# Patient Record
Sex: FEMALE | Race: WHITE | NOT HISPANIC OR LATINO | Employment: OTHER | URBAN - METROPOLITAN AREA
[De-identification: names, ages, dates, MRNs, and addresses within clinical notes are randomized per-mention and may not be internally consistent; named-entity substitution may affect disease eponyms.]

---

## 2021-06-25 ENCOUNTER — OFFICE VISIT (OUTPATIENT)
Dept: PRIMARY CARE CLINIC | Facility: CLINIC | Age: 61
End: 2021-06-25
Payer: MEDICAID

## 2021-06-25 VITALS
WEIGHT: 220.25 LBS | HEART RATE: 65 BPM | SYSTOLIC BLOOD PRESSURE: 110 MMHG | HEIGHT: 60 IN | BODY MASS INDEX: 43.24 KG/M2 | OXYGEN SATURATION: 95 % | DIASTOLIC BLOOD PRESSURE: 71 MMHG

## 2021-06-25 DIAGNOSIS — L30.4 INTERTRIGO: ICD-10-CM

## 2021-06-25 DIAGNOSIS — E11.65 TYPE 2 DIABETES MELLITUS WITH HYPERGLYCEMIA, UNSPECIFIED WHETHER LONG TERM INSULIN USE: ICD-10-CM

## 2021-06-25 DIAGNOSIS — Z12.39 ENCOUNTER FOR SCREENING FOR MALIGNANT NEOPLASM OF BREAST, UNSPECIFIED SCREENING MODALITY: Primary | ICD-10-CM

## 2021-06-25 PROCEDURE — 99203 OFFICE O/P NEW LOW 30 MIN: CPT | Mod: S$GLB,,, | Performed by: INTERNAL MEDICINE

## 2021-06-25 PROCEDURE — 99203 PR OFFICE/OUTPT VISIT, NEW, LEVL III, 30-44 MIN: ICD-10-PCS | Mod: S$GLB,,, | Performed by: INTERNAL MEDICINE

## 2021-06-25 RX ORDER — IBUPROFEN 100 MG/5ML
1000 SUSPENSION, ORAL (FINAL DOSE FORM) ORAL DAILY
COMMUNITY

## 2021-06-25 RX ORDER — CHOLECALCIFEROL (VITAMIN D3) 25 MCG
1000 TABLET ORAL DAILY
COMMUNITY

## 2021-06-25 RX ORDER — MONTELUKAST SODIUM 10 MG/1
10 TABLET ORAL NIGHTLY
COMMUNITY
Start: 2021-06-04 | End: 2022-03-15 | Stop reason: SDUPTHER

## 2021-06-25 RX ORDER — DULOXETIN HYDROCHLORIDE 60 MG/1
120 CAPSULE, DELAYED RELEASE ORAL EVERY MORNING
COMMUNITY
Start: 2021-02-22

## 2021-06-25 RX ORDER — LISINOPRIL 5 MG/1
5 TABLET ORAL NIGHTLY
COMMUNITY
Start: 2021-06-01 | End: 2022-04-21 | Stop reason: ALTCHOICE

## 2021-06-25 RX ORDER — CALCIUM CARB/VITAMIN D3/VIT K1 650MG-12.5
TABLET,CHEWABLE ORAL NIGHTLY
COMMUNITY

## 2021-06-25 RX ORDER — TEMAZEPAM 15 MG/1
15 CAPSULE ORAL NIGHTLY
COMMUNITY
Start: 2021-02-09 | End: 2022-04-21

## 2021-06-25 RX ORDER — ATORVASTATIN CALCIUM 40 MG/1
40 TABLET, FILM COATED ORAL NIGHTLY
COMMUNITY
Start: 2021-03-18 | End: 2021-09-24 | Stop reason: SDUPTHER

## 2021-06-25 RX ORDER — CALCIUM CARB/VITAMIN D3/VIT K1 500-500-40
1 TABLET,CHEWABLE ORAL DAILY
COMMUNITY

## 2021-06-25 RX ORDER — CLONAZEPAM 0.5 MG/1
0.5 TABLET ORAL DAILY
COMMUNITY
Start: 2021-05-16 | End: 2022-02-23

## 2021-06-25 RX ORDER — NYSTATIN 100000 U/G
CREAM TOPICAL 2 TIMES DAILY
Qty: 80 G | Refills: 3 | Status: SHIPPED | OUTPATIENT
Start: 2021-06-25 | End: 2022-02-23

## 2021-06-25 RX ORDER — HYDROCODONE BITARTRATE AND ACETAMINOPHEN 7.5; 325 MG/1; MG/1
1 TABLET ORAL 2 TIMES DAILY PRN
COMMUNITY
Start: 2021-05-08

## 2021-06-25 RX ORDER — BACLOFEN 10 MG/1
10 TABLET ORAL 3 TIMES DAILY
COMMUNITY
Start: 2021-06-04 | End: 2021-08-30 | Stop reason: SDUPTHER

## 2021-06-25 RX ORDER — LORATADINE 10 MG/1
10 TABLET ORAL DAILY
COMMUNITY
End: 2022-11-30 | Stop reason: ALTCHOICE

## 2021-06-25 RX ORDER — GABAPENTIN 800 MG/1
800 TABLET ORAL 2 TIMES DAILY
COMMUNITY
Start: 2021-05-07 | End: 2021-08-30 | Stop reason: SDUPTHER

## 2021-08-16 ENCOUNTER — TELEPHONE (OUTPATIENT)
Dept: PRIMARY CARE CLINIC | Facility: CLINIC | Age: 61
End: 2021-08-16

## 2021-08-17 ENCOUNTER — TELEPHONE (OUTPATIENT)
Dept: PRIMARY CARE CLINIC | Facility: CLINIC | Age: 61
End: 2021-08-17

## 2021-08-30 ENCOUNTER — OFFICE VISIT (OUTPATIENT)
Dept: PRIMARY CARE CLINIC | Facility: CLINIC | Age: 61
End: 2021-08-30
Payer: MEDICAID

## 2021-08-30 VITALS
OXYGEN SATURATION: 95 % | HEART RATE: 80 BPM | HEIGHT: 60 IN | WEIGHT: 206 LBS | TEMPERATURE: 98 F | RESPIRATION RATE: 16 BRPM | DIASTOLIC BLOOD PRESSURE: 70 MMHG | SYSTOLIC BLOOD PRESSURE: 112 MMHG | BODY MASS INDEX: 40.44 KG/M2

## 2021-08-30 DIAGNOSIS — M43.22 CERVICAL VERTEBRAL FUSION: ICD-10-CM

## 2021-08-30 DIAGNOSIS — G62.9 NEUROPATHY: Primary | ICD-10-CM

## 2021-08-30 DIAGNOSIS — E11.65 TYPE 2 DIABETES MELLITUS WITH HYPERGLYCEMIA, UNSPECIFIED WHETHER LONG TERM INSULIN USE: ICD-10-CM

## 2021-08-30 PROCEDURE — 99213 OFFICE O/P EST LOW 20 MIN: CPT | Mod: S$GLB,,, | Performed by: INTERNAL MEDICINE

## 2021-08-30 PROCEDURE — 99213 PR OFFICE/OUTPT VISIT, EST, LEVL III, 20-29 MIN: ICD-10-PCS | Mod: S$GLB,,, | Performed by: INTERNAL MEDICINE

## 2021-08-30 RX ORDER — TRAMADOL HYDROCHLORIDE 50 MG/1
50 TABLET ORAL
COMMUNITY
Start: 2021-05-20 | End: 2021-08-30 | Stop reason: SDUPTHER

## 2021-08-30 RX ORDER — LIDOCAINE 50 MG/G
PATCH TOPICAL
COMMUNITY
Start: 2021-08-22 | End: 2022-11-30 | Stop reason: ALTCHOICE

## 2021-08-30 RX ORDER — GABAPENTIN 800 MG/1
800 TABLET ORAL 2 TIMES DAILY
Qty: 60 TABLET | Refills: 2 | Status: SHIPPED | OUTPATIENT
Start: 2021-08-30 | End: 2021-11-30

## 2021-08-30 RX ORDER — OXYCODONE AND ACETAMINOPHEN 10; 325 MG/1; MG/1
1 TABLET ORAL EVERY 4 HOURS PRN
COMMUNITY
Start: 2021-07-30 | End: 2021-08-30 | Stop reason: SDUPTHER

## 2021-08-30 RX ORDER — ONDANSETRON 4 MG/1
4 TABLET, FILM COATED ORAL 3 TIMES DAILY PRN
COMMUNITY
Start: 2021-07-30 | End: 2022-02-15 | Stop reason: SDUPTHER

## 2021-08-30 RX ORDER — INSULIN GLARGINE 100 [IU]/ML
INJECTION, SOLUTION SUBCUTANEOUS
COMMUNITY
Start: 2021-06-19 | End: 2022-03-26 | Stop reason: ALTCHOICE

## 2021-08-30 RX ORDER — CLONIDINE HYDROCHLORIDE 0.1 MG/1
0.1 TABLET ORAL DAILY
COMMUNITY
Start: 2021-05-20 | End: 2022-02-23

## 2021-08-30 RX ORDER — METHOCARBAMOL 500 MG/1
500 TABLET, FILM COATED ORAL 2 TIMES DAILY PRN
COMMUNITY
Start: 2021-08-23 | End: 2022-02-23

## 2021-09-01 PROBLEM — M19.011 PRIMARY OSTEOARTHRITIS OF RIGHT SHOULDER: Status: ACTIVE | Noted: 2021-09-01

## 2021-09-24 ENCOUNTER — OFFICE VISIT (OUTPATIENT)
Dept: PRIMARY CARE CLINIC | Facility: CLINIC | Age: 61
End: 2021-09-24
Payer: MEDICAID

## 2021-09-24 ENCOUNTER — TELEPHONE (OUTPATIENT)
Dept: PRIMARY CARE CLINIC | Facility: CLINIC | Age: 61
End: 2021-09-24

## 2021-09-24 VITALS
OXYGEN SATURATION: 98 % | SYSTOLIC BLOOD PRESSURE: 134 MMHG | WEIGHT: 200 LBS | RESPIRATION RATE: 18 BRPM | HEART RATE: 89 BPM | BODY MASS INDEX: 39.27 KG/M2 | HEIGHT: 60 IN | DIASTOLIC BLOOD PRESSURE: 88 MMHG

## 2021-09-24 DIAGNOSIS — E11.65 TYPE 2 DIABETES MELLITUS WITH HYPERGLYCEMIA, UNSPECIFIED WHETHER LONG TERM INSULIN USE: Primary | ICD-10-CM

## 2021-09-24 DIAGNOSIS — Z23 IMMUNIZATION DUE: ICD-10-CM

## 2021-09-24 DIAGNOSIS — G62.9 NEUROPATHY: ICD-10-CM

## 2021-09-24 DIAGNOSIS — M43.22 CERVICAL VERTEBRAL FUSION: ICD-10-CM

## 2021-09-24 PROCEDURE — 99214 PR OFFICE/OUTPT VISIT, EST, LEVL IV, 30-39 MIN: ICD-10-PCS | Mod: 25,S$GLB,, | Performed by: INTERNAL MEDICINE

## 2021-09-24 PROCEDURE — 90732 PPSV23 VACC 2 YRS+ SUBQ/IM: CPT | Mod: S$GLB,,, | Performed by: INTERNAL MEDICINE

## 2021-09-24 PROCEDURE — 90732 PNEUMOCOCCAL POLYSACCHARIDE VACCINE 23-VALENT =>2YO SQ IM: ICD-10-PCS | Mod: S$GLB,,, | Performed by: INTERNAL MEDICINE

## 2021-09-24 PROCEDURE — 90471 IMMUNIZATION ADMIN: CPT | Mod: S$GLB,,, | Performed by: INTERNAL MEDICINE

## 2021-09-24 PROCEDURE — 99214 OFFICE O/P EST MOD 30 MIN: CPT | Mod: 25,S$GLB,, | Performed by: INTERNAL MEDICINE

## 2021-09-24 PROCEDURE — 90471 PNEUMOCOCCAL POLYSACCHARIDE VACCINE 23-VALENT =>2YO SQ IM: ICD-10-PCS | Mod: S$GLB,,, | Performed by: INTERNAL MEDICINE

## 2021-09-24 RX ORDER — ATORVASTATIN CALCIUM 40 MG/1
40 TABLET, FILM COATED ORAL NIGHTLY
Qty: 90 TABLET | Refills: 3 | Status: SHIPPED | OUTPATIENT
Start: 2021-09-24 | End: 2022-05-05

## 2021-09-25 LAB
BUN SERPL-MCNC: 17 MG/DL (ref 7–25)
BUN/CREAT SERPL: 14 (CALC) (ref 6–22)
CALCIUM SERPL-MCNC: 10.3 MG/DL (ref 8.6–10.4)
CHLORIDE SERPL-SCNC: 98 MMOL/L (ref 98–110)
CHOLEST SERPL-MCNC: 171 MG/DL
CHOLEST/HDLC SERPL: 3.4 (CALC)
CO2 SERPL-SCNC: 27 MMOL/L (ref 20–32)
CREAT SERPL-MCNC: 1.21 MG/DL (ref 0.5–0.99)
GLUCOSE SERPL-MCNC: 170 MG/DL (ref 65–99)
HBA1C MFR BLD: 6.1 % OF TOTAL HGB
HDLC SERPL-MCNC: 51 MG/DL
LDLC SERPL CALC-MCNC: 95 MG/DL (CALC)
NONHDLC SERPL-MCNC: 120 MG/DL (CALC)
POTASSIUM SERPL-SCNC: 4.5 MMOL/L (ref 3.5–5.3)
SODIUM SERPL-SCNC: 140 MMOL/L (ref 135–146)
TRIGL SERPL-MCNC: 157 MG/DL
TSH SERPL-ACNC: 2.31 MIU/L (ref 0.4–4.5)

## 2021-09-27 RX ORDER — DULAGLUTIDE 1.5 MG/.5ML
1.5 INJECTION, SOLUTION SUBCUTANEOUS
Qty: 4 PEN | Refills: 11 | Status: SHIPPED | OUTPATIENT
Start: 2021-09-27 | End: 2021-11-08 | Stop reason: SDUPTHER

## 2021-09-28 ENCOUNTER — TELEPHONE (OUTPATIENT)
Dept: PRIMARY CARE CLINIC | Facility: CLINIC | Age: 61
End: 2021-09-28

## 2021-10-18 ENCOUNTER — TELEPHONE (OUTPATIENT)
Dept: PRIMARY CARE CLINIC | Facility: CLINIC | Age: 61
End: 2021-10-18

## 2021-10-28 ENCOUNTER — TELEPHONE (OUTPATIENT)
Dept: PRIMARY CARE CLINIC | Facility: CLINIC | Age: 61
End: 2021-10-28
Payer: MEDICAID

## 2021-10-28 ENCOUNTER — TELEPHONE (OUTPATIENT)
Dept: FAMILY MEDICINE | Facility: CLINIC | Age: 61
End: 2021-10-28
Payer: MEDICAID

## 2021-11-08 DIAGNOSIS — E11.65 TYPE 2 DIABETES MELLITUS WITH HYPERGLYCEMIA, UNSPECIFIED WHETHER LONG TERM INSULIN USE: ICD-10-CM

## 2021-11-10 RX ORDER — DULAGLUTIDE 1.5 MG/.5ML
1.5 INJECTION, SOLUTION SUBCUTANEOUS
Qty: 4 PEN | Refills: 11 | Status: SHIPPED | OUTPATIENT
Start: 2021-11-10 | End: 2022-01-28 | Stop reason: SDUPTHER

## 2021-11-23 ENCOUNTER — TELEPHONE (OUTPATIENT)
Dept: PRIMARY CARE CLINIC | Facility: CLINIC | Age: 61
End: 2021-11-23
Payer: MEDICAID

## 2021-12-13 ENCOUNTER — TELEPHONE (OUTPATIENT)
Dept: PRIMARY CARE CLINIC | Facility: CLINIC | Age: 61
End: 2021-12-13
Payer: MEDICAID

## 2021-12-15 ENCOUNTER — OFFICE VISIT (OUTPATIENT)
Dept: PRIMARY CARE CLINIC | Facility: CLINIC | Age: 61
End: 2021-12-15
Payer: MEDICAID

## 2021-12-15 VITALS
DIASTOLIC BLOOD PRESSURE: 70 MMHG | HEART RATE: 84 BPM | BODY MASS INDEX: 38.56 KG/M2 | WEIGHT: 196.38 LBS | OXYGEN SATURATION: 96 % | HEIGHT: 60 IN | SYSTOLIC BLOOD PRESSURE: 100 MMHG

## 2021-12-15 DIAGNOSIS — E11.65 TYPE 2 DIABETES MELLITUS WITH HYPERGLYCEMIA, UNSPECIFIED WHETHER LONG TERM INSULIN USE: Primary | ICD-10-CM

## 2021-12-15 DIAGNOSIS — G62.9 NEUROPATHY: ICD-10-CM

## 2021-12-15 PROCEDURE — 4010F PR ACE/ARB THEARPY RXD/TAKEN: ICD-10-PCS | Mod: CPTII,S$GLB,, | Performed by: INTERNAL MEDICINE

## 2021-12-15 PROCEDURE — 99213 OFFICE O/P EST LOW 20 MIN: CPT | Mod: S$GLB,,, | Performed by: INTERNAL MEDICINE

## 2021-12-15 PROCEDURE — 4010F ACE/ARB THERAPY RXD/TAKEN: CPT | Mod: CPTII,S$GLB,, | Performed by: INTERNAL MEDICINE

## 2021-12-15 PROCEDURE — 99213 PR OFFICE/OUTPT VISIT, EST, LEVL III, 20-29 MIN: ICD-10-PCS | Mod: S$GLB,,, | Performed by: INTERNAL MEDICINE

## 2021-12-28 ENCOUNTER — TELEPHONE (OUTPATIENT)
Dept: PRIMARY CARE CLINIC | Facility: CLINIC | Age: 61
End: 2021-12-28
Payer: MEDICAID

## 2022-01-28 DIAGNOSIS — E11.65 TYPE 2 DIABETES MELLITUS WITH HYPERGLYCEMIA, UNSPECIFIED WHETHER LONG TERM INSULIN USE: ICD-10-CM

## 2022-01-28 RX ORDER — DULAGLUTIDE 1.5 MG/.5ML
1.5 INJECTION, SOLUTION SUBCUTANEOUS
Qty: 4 PEN | Refills: 11 | Status: SHIPPED | OUTPATIENT
Start: 2022-01-28 | End: 2022-03-26 | Stop reason: ALTCHOICE

## 2022-01-28 NOTE — TELEPHONE ENCOUNTER
----- Message from Rosalee Kimball sent at 1/28/2022  2:41 PM CST -----  Contact: PT      Name of Caller: Avelina  Pharmacy Name: .  [facility]/pharmacy #[id number] - [place], [place] - [address] AT [facility]  [address]  [place] [address]  Phone: [phone] Fax: [phone]      Prescription Name: dulaglutide (TRULICITY) 1.5 mg/0.5 mL pen injector  What do they need to clarify?: insurance need to know that pt is not taking metformin for approval   Best Call Back Number:.[phone] (home)      Additional Information:

## 2022-02-15 ENCOUNTER — TELEPHONE (OUTPATIENT)
Dept: PRIMARY CARE CLINIC | Facility: CLINIC | Age: 62
End: 2022-02-15
Payer: MEDICAID

## 2022-02-15 DIAGNOSIS — R11.0 NAUSEA: Primary | ICD-10-CM

## 2022-02-15 RX ORDER — ONDANSETRON 4 MG/1
4 TABLET, FILM COATED ORAL EVERY 8 HOURS PRN
Qty: 30 TABLET | Refills: 0 | Status: SHIPPED | OUTPATIENT
Start: 2022-02-15 | End: 2022-05-08

## 2022-02-15 NOTE — TELEPHONE ENCOUNTER
Pt states she has been experiencing diarrhea and nausea and dizzy and has been able to keep anything down besides water and would like to know if she can get medication sent to her pharmacy. [facility] [address]                  ----- Message from Malini Mai sent at 2/15/2022 10:35 AM CST -----  Regarding: SAME DAY FRANCO  Patient calling for same day franco, diarrhea and nausea. Call back number [phone]. Tks

## 2022-02-17 ENCOUNTER — TELEPHONE (OUTPATIENT)
Dept: PRIMARY CARE CLINIC | Facility: CLINIC | Age: 62
End: 2022-02-17
Payer: MEDICAID

## 2022-02-17 NOTE — TELEPHONE ENCOUNTER
Spoke with [facility] and they are faxing orders for pt                      ----- Message from Rosalee Kimball sent at 2/17/2022  2:46 PM CST -----  Contact: [facility]      Who Called: Dale     Does the patient know what this is regarding?: sending order by fax 02/17/2022/ status   Would the patient rather a call back or a response via [facility]?  Callback    Best Call Back Number: [phone] ext [phone]

## 2022-02-22 ENCOUNTER — TELEPHONE (OUTPATIENT)
Dept: PRIMARY CARE CLINIC | Facility: CLINIC | Age: 62
End: 2022-02-22
Payer: MEDICAID

## 2022-02-22 NOTE — TELEPHONE ENCOUNTER
Pt states she has been having dizzy spells for about a week and is requesting an appointment                ----- Message from Pau Harper sent at 2/22/2022  3:59 PM CST -----  Type:  Sooner Apoointment Request    Caller is requesting a sooner appointment.  Caller declined first available appointment listed below.  Caller will not accept being placed on the waitlist and is requesting a message be sent to doctor.  Name of Caller:Avelina King    When is the first available appointment? 04/27/22 ( pt is scheduled for 3//15/22  Symptoms: dizziness   Would the patient rather a call back or a response via [facility]?    Best Call Back Number:[phone]    Additional Information:  please call if any sooner appts avail

## 2022-02-23 ENCOUNTER — OFFICE VISIT (OUTPATIENT)
Dept: PRIMARY CARE CLINIC | Facility: CLINIC | Age: 62
End: 2022-02-23
Payer: MEDICARE

## 2022-02-23 VITALS
SYSTOLIC BLOOD PRESSURE: 114 MMHG | OXYGEN SATURATION: 97 % | WEIGHT: 186 LBS | HEIGHT: 60 IN | HEART RATE: 98 BPM | BODY MASS INDEX: 36.52 KG/M2 | DIASTOLIC BLOOD PRESSURE: 83 MMHG

## 2022-02-23 DIAGNOSIS — G89.29 CHRONIC LOW BACK PAIN, UNSPECIFIED BACK PAIN LATERALITY, UNSPECIFIED WHETHER SCIATICA PRESENT: ICD-10-CM

## 2022-02-23 DIAGNOSIS — R11.0 NAUSEA: Primary | ICD-10-CM

## 2022-02-23 DIAGNOSIS — M54.50 CHRONIC LOW BACK PAIN, UNSPECIFIED BACK PAIN LATERALITY, UNSPECIFIED WHETHER SCIATICA PRESENT: ICD-10-CM

## 2022-02-23 DIAGNOSIS — T50.905S ADVERSE EFFECT OF DRUG, SEQUELA: ICD-10-CM

## 2022-02-23 DIAGNOSIS — E11.65 TYPE 2 DIABETES MELLITUS WITH HYPERGLYCEMIA, UNSPECIFIED WHETHER LONG TERM INSULIN USE: ICD-10-CM

## 2022-02-23 DIAGNOSIS — R69 TAKING MULTIPLE MEDICATIONS FOR CHRONIC DISEASE: ICD-10-CM

## 2022-02-23 PROCEDURE — 99214 PR OFFICE/OUTPT VISIT, EST, LEVL IV, 30-39 MIN: ICD-10-PCS | Mod: S$GLB,,, | Performed by: INTERNAL MEDICINE

## 2022-02-23 PROCEDURE — 99214 OFFICE O/P EST MOD 30 MIN: CPT | Mod: S$GLB,,, | Performed by: INTERNAL MEDICINE

## 2022-02-23 NOTE — PROGRESS NOTES
Subjective:      Patient ID: Avelina King is a 61 y.o. female.    Chief Complaint: Dizziness (Pt is complaining of dizziness)    HPI     Patient recently had a bout of possible viral infection where she had some nausea and diarrhea. She states she has been checking her blood glucose diligently and denies hypoglycemic episodes. She had back surgery (second time) 2 months ago. Reviewing her medications she is on clonazepam which she takes as needed at night, temazepam, hydrocodone, cymbalta 120 mg, gabapentin 800 mg twice a day, cyclobenzaprine 10 mg twice a day. She is only on lisinopril 5 mg, states she does not take clonidine    Review of Systems   Constitutional: Negative for chills and fever.   Respiratory: Negative for cough, shortness of breath and wheezing.    Cardiovascular: Negative for chest pain, palpitations and leg swelling.   Genitourinary: Negative for dysuria, frequency and urgency.   Musculoskeletal: Positive for back pain. Negative for falls.   Neurological: Positive for dizziness.        Lightheadedness   Psychiatric/Behavioral: Negative for depression. The patient is not nervous/anxious.      Objective:     Physical Exam  Vitals reviewed.   Constitutional:       Appearance: Normal appearance. She is obese.   HENT:      Head: Normocephalic.   Eyes:      Extraocular Movements: Extraocular movements intact.      Conjunctiva/sclera: Conjunctivae normal.      Pupils: Pupils are equal, round, and reactive to light.   Cardiovascular:      Rate and Rhythm: Normal rate and regular rhythm.   Pulmonary:      Effort: Pulmonary effort is normal.      Breath sounds: Normal breath sounds.   Musculoskeletal:      Right lower leg: No edema.      Left lower leg: No edema.   Skin:     General: Skin is warm.      Capillary Refill: Capillary refill takes less than 2 seconds.   Neurological:      General: No focal deficit present.      Mental Status: She is alert and oriented to person, place, and time.    Psychiatric:         Mood and Affect: Mood normal.        /83 (BP Location: Right arm, Patient Position: Sitting, BP Method: Medium (Automatic))   Pulse 98   Ht 5' (1.524 m)   Wt 84.4 kg (186 lb)   SpO2 97%   BMI 36.33 kg/m²     Assessment:       ICD-10-CM ICD-9-CM   1. Nausea  R11.0 787.02   2. Type 2 diabetes mellitus with hyperglycemia, unspecified whether long term insulin use  E11.65 250.00   3. Chronic low back pain, unspecified back pain laterality, unspecified whether sciatica present  M54.50 724.2    G89.29 338.29   4. Adverse effect of drug, sequela  T50.905S 909.5   5. Taking multiple medications for chronic disease  R69 799.9       Plan:     Medication List with Changes/Refills   Current Medications    ASCORBIC ACID, VITAMIN C, (VITAMIN C) 1000 MG TABLET    Take 1,000 mg by mouth once daily.    ATORVASTATIN (LIPITOR) 40 MG TABLET    Take 1 tablet (40 mg total) by mouth every evening.    CALCIUM-VITAMIN D3-VITAMIN K 650 MG-12.5 MCG-40 MCG CHEW    Take by mouth every evening.    CARTILAGE/COLLAGEN II/HYALURON (MOVE FREE ULTRA ORAL)    Take 1 tablet by mouth once daily.    CHROMIUM PICOLINATE 1,000 MCG TAB    Take 1 tablet by mouth once daily.    DULAGLUTIDE (TRULICITY) 1.5 MG/0.5 ML PEN INJECTOR    Inject 1.5 mg into the skin every 7 days.    DULOXETINE (CYMBALTA) 60 MG CAPSULE    Take 120 mg by mouth every morning.    GABAPENTIN (NEURONTIN) 800 MG TABLET    TAKE 1 TABLET BY MOUTH 2 TIMES DAILY.    HYDROCODONE-ACETAMINOPHEN (NORCO) 7.5-325 MG PER TABLET    Take 1 tablet by mouth 2 (two) times daily as needed.    LANTUS SOLOSTAR U-100 INSULIN GLARGINE 100 UNITS/ML (3ML) SUBQ PEN    INJECT 32 UNITS UNDER THE SKIN ONCE A DAY    LIDOCAINE (LIDODERM) 5 %    APPLY 1 PATCH TO AFFECTED AREA, LEAVE ON FOR 12 HOURS THEN REMOVE AND LEAVE OFF FOR 12 HOURS    LISINOPRIL (PRINIVIL,ZESTRIL) 5 MG TABLET    Take 5 mg by mouth every evening.    LORATADINE (CLARITIN) 10 MG TABLET    Take 10 mg by mouth once  daily.    MONTELUKAST (SINGULAIR) 10 MG TABLET    Take 10 mg by mouth every evening.    MULTIVIT-MINERALS/FOLIC ACID (WOMEN'S MULTIVITAMIN GUMMIES ORAL)    Take by mouth once daily.    ONDANSETRON (ZOFRAN) 4 MG TABLET    Take 1 tablet (4 mg total) by mouth every 8 (eight) hours as needed.    TEMAZEPAM (RESTORIL) 15 MG CAP    Take 15 mg by mouth every evening.    VITAMIN D (VITAMIN D3) 1000 UNITS TAB    Take 1,000 Units by mouth once daily.   Discontinued Medications    CLONAZEPAM (KLONOPIN) 0.5 MG TABLET    Take 0.5 mg by mouth once daily.    CLONIDINE (CATAPRES) 0.1 MG TABLET    Take 0.1 mg by mouth once daily.    METHOCARBAMOL (ROBAXIN) 500 MG TAB    Take 500 mg by mouth 2 (two) times daily as needed.    NYSTATIN (MYCOSTATIN) CREAM    Apply topically 2 (two) times daily.        Nausea    Type 2 diabetes mellitus with hyperglycemia, unspecified whether long term insulin use    Chronic low back pain, unspecified back pain laterality, unspecified whether sciatica present    Adverse effect of drug, sequela    Taking multiple medications for chronic disease         20-minute visit. 5 minutes spent counseling patient on diet, exercise, and weight loss.       Future Appointments   Date Time Provider Department Center   3/15/2022  2:00 PM [facility]       Patient is on way too many sedating medications. She gets PT twice a week but is apparently supposed to get it three times a week, she states they need to get her up and walking and she is unable to do that. She used her walker and was able to ambulate today in clinic. She states she has not gotten dizzy before and has been on all these medications however she did suffer recently from a viral illness. She can decrease lisinopril to 2.5 mg but she needs to decrease all these sedating medications as much as she can. Cymbalta is reportedly for pain. She takes a nap in the afternoon however takes temazepam to sleep at night.   I told her to  keep pain medications for when she is getting PT and try to use tylneol instead for pain. If dizziness is still present despite eliminating most of her medications then we can investigate underlying causes. She needs to continue to hydrate with electrolyte solutions like pedialyte

## 2022-03-15 ENCOUNTER — OFFICE VISIT (OUTPATIENT)
Dept: PRIMARY CARE CLINIC | Facility: CLINIC | Age: 62
End: 2022-03-15
Payer: MEDICARE

## 2022-03-15 ENCOUNTER — TELEPHONE (OUTPATIENT)
Dept: PRIMARY CARE CLINIC | Facility: CLINIC | Age: 62
End: 2022-03-15
Payer: MEDICAID

## 2022-03-15 ENCOUNTER — TELEPHONE (OUTPATIENT)
Dept: PRIMARY CARE CLINIC | Facility: CLINIC | Age: 62
End: 2022-03-15

## 2022-03-15 VITALS
OXYGEN SATURATION: 96 % | DIASTOLIC BLOOD PRESSURE: 75 MMHG | HEART RATE: 90 BPM | BODY MASS INDEX: 36.84 KG/M2 | SYSTOLIC BLOOD PRESSURE: 109 MMHG | WEIGHT: 187.63 LBS | HEIGHT: 60 IN

## 2022-03-15 DIAGNOSIS — E66.9 OBESITY (BMI 30-39.9): ICD-10-CM

## 2022-03-15 DIAGNOSIS — M54.9 BACK PAIN WITH HISTORY OF SPINAL SURGERY: ICD-10-CM

## 2022-03-15 DIAGNOSIS — Z12.39 ENCOUNTER FOR SCREENING FOR MALIGNANT NEOPLASM OF BREAST, UNSPECIFIED SCREENING MODALITY: ICD-10-CM

## 2022-03-15 DIAGNOSIS — M19.011 PRIMARY OSTEOARTHRITIS OF RIGHT SHOULDER: ICD-10-CM

## 2022-03-15 DIAGNOSIS — G89.29 CHRONIC LOW BACK PAIN, UNSPECIFIED BACK PAIN LATERALITY, UNSPECIFIED WHETHER SCIATICA PRESENT: ICD-10-CM

## 2022-03-15 DIAGNOSIS — Z98.890 BACK PAIN WITH HISTORY OF SPINAL SURGERY: ICD-10-CM

## 2022-03-15 DIAGNOSIS — J30.9 ALLERGIC RHINITIS, UNSPECIFIED SEASONALITY, UNSPECIFIED TRIGGER: ICD-10-CM

## 2022-03-15 DIAGNOSIS — E11.65 TYPE 2 DIABETES MELLITUS WITH HYPERGLYCEMIA, UNSPECIFIED WHETHER LONG TERM INSULIN USE: Primary | ICD-10-CM

## 2022-03-15 DIAGNOSIS — G62.9 NEUROPATHY: ICD-10-CM

## 2022-03-15 DIAGNOSIS — Z12.11 SCREENING FOR COLON CANCER: ICD-10-CM

## 2022-03-15 DIAGNOSIS — M54.50 CHRONIC LOW BACK PAIN, UNSPECIFIED BACK PAIN LATERALITY, UNSPECIFIED WHETHER SCIATICA PRESENT: ICD-10-CM

## 2022-03-15 DIAGNOSIS — Z12.4 SCREENING FOR CERVICAL CANCER: ICD-10-CM

## 2022-03-15 PROCEDURE — 99214 PR OFFICE/OUTPT VISIT, EST, LEVL IV, 30-39 MIN: ICD-10-PCS | Mod: S$GLB,,, | Performed by: INTERNAL MEDICINE

## 2022-03-15 PROCEDURE — 99214 OFFICE O/P EST MOD 30 MIN: CPT | Mod: S$GLB,,, | Performed by: INTERNAL MEDICINE

## 2022-03-15 RX ORDER — CYCLOBENZAPRINE HCL 10 MG
10 TABLET ORAL 2 TIMES DAILY
COMMUNITY
Start: 2022-02-19 | End: 2022-04-21

## 2022-03-15 RX ORDER — MONTELUKAST SODIUM 10 MG/1
10 TABLET ORAL NIGHTLY
Qty: 90 TABLET | Refills: 3 | Status: SHIPPED | OUTPATIENT
Start: 2022-03-15 | End: 2022-05-05

## 2022-03-15 NOTE — TELEPHONE ENCOUNTER
Can you add this info below to her chart note for today.     ----- Message from Angeli Enrique sent at 3/15/2022  1:49 PM CDT -----  Contact: Lauryn  Please fax over to [facility] medical necessity notes regarding the need for the patient wheelchair      Fax #  [phone]      Fax to Dale at [place]      Call back # [phone]  ext [phone]

## 2022-03-15 NOTE — TELEPHONE ENCOUNTER
Sending fax over to Nieves at     ----- Message from Snow Alvarez sent at 3/15/2022 12:51 PM CDT -----  [facility] [place]  Requesting a call back regarding DME order form she sent over - hasn't received anything back. Please call back at [phone] ext [phone].

## 2022-03-15 NOTE — PROGRESS NOTES
Subjective:      Patient ID: Avelina King is a 61 y.o. female.    Chief Complaint: Follow-up    HPI     Patient here for follow up. She decreased all the sedating medications we had discussed at the last visit and states dizziness/nausea has decreased.   She does however have another surgery coming up for her back and will be in need of a wheelchair. She is unable to walk greater than 10 feet without needing to rest and once she has surgery she will need time to recuperate. She is at risk for falls after her surgery       Review of Systems   Constitutional: Negative for chills and fever.   Respiratory: Negative for cough, shortness of breath and wheezing.    Cardiovascular: Negative for chest pain, palpitations and leg swelling.   Gastrointestinal: Negative for abdominal pain, constipation, diarrhea, nausea and vomiting.   Genitourinary: Negative for dysuria, frequency and urgency.   Musculoskeletal: Positive for back pain. Negative for falls and joint pain.   Neurological: Negative for dizziness and headaches.   Psychiatric/Behavioral: Negative for hallucinations, substance abuse and suicidal ideas. The patient has insomnia.      Objective:     Physical Exam  Vitals reviewed.   Constitutional:       Appearance: Normal appearance. She is obese.   Eyes:      Extraocular Movements: Extraocular movements intact.      Conjunctiva/sclera: Conjunctivae normal.      Pupils: Pupils are equal, round, and reactive to light.   Cardiovascular:      Rate and Rhythm: Normal rate and regular rhythm.   Pulmonary:      Effort: Pulmonary effort is normal.      Breath sounds: Normal breath sounds.   Abdominal:      General: Bowel sounds are normal.   Musculoskeletal:      Right lower leg: No edema.      Left lower leg: No edema.   Skin:     General: Skin is warm.      Capillary Refill: Capillary refill takes less than 2 seconds.   Neurological:      General: No focal deficit present.      Mental Status: She is alert and oriented to  person, place, and time.   Psychiatric:         Mood and Affect: Mood normal.        /75 (BP Location: Right arm, Patient Position: Sitting, BP Method: Medium (Automatic))   Pulse 90   Ht 5' (1.524 m)   Wt 85.1 kg (187 lb 9.6 oz)   SpO2 96%   BMI 36.64 kg/m²     Assessment:       ICD-10-CM ICD-9-CM   1. Type 2 diabetes mellitus with hyperglycemia, unspecified whether long term insulin use  E11.65 250.00   2. Screening for cervical cancer  Z12.4 V76.2   3. Encounter for screening for malignant neoplasm of breast, unspecified screening modality  Z12.39 V76.10   4. Screening for colon cancer  Z12.11 V76.51   5. Allergic rhinitis, unspecified seasonality, unspecified trigger  J30.9 477.9   6. Chronic low back pain, unspecified back pain laterality, unspecified whether sciatica present  M54.50 724.2    G89.29 338.29   7. Obesity (BMI 30-39.9)  E66.9 278.00   8. Neuropathy  G62.9 355.9   9. Primary osteoarthritis of right shoulder  M19.011 715.11   10. Back pain with history of spinal surgery  M54.9 724.5    Z98.890        Plan:     Medication List with Changes/Refills   New Medications    SEMAGLUTIDE (OZEMPIC) 0.25 MG OR 0.5 MG(2 MG/1.5 ML) PEN INJECTOR    Inject 0.25 mg into the skin every 7 days.   Current Medications    ASCORBIC ACID, VITAMIN C, (VITAMIN C) 1000 MG TABLET    Take 1,000 mg by mouth once daily.    ATORVASTATIN (LIPITOR) 40 MG TABLET    Take 1 tablet (40 mg total) by mouth every evening.    CALCIUM-VITAMIN D3-VITAMIN K 650 MG-12.5 MCG-40 MCG CHEW    Take by mouth every evening.    CARTILAGE/COLLAGEN II/HYALURON (MOVE FREE ULTRA ORAL)    Take 1 tablet by mouth once daily.    CHROMIUM PICOLINATE 1,000 MCG TAB    Take 1 tablet by mouth once daily.    CYCLOBENZAPRINE (FLEXERIL) 10 MG TABLET    Take 10 mg by mouth 2 (two) times daily.    DULOXETINE (CYMBALTA) 60 MG CAPSULE    Take 120 mg by mouth every morning.    GABAPENTIN (NEURONTIN) 800 MG TABLET    TAKE 1 TABLET BY MOUTH 2 TIMES DAILY.     HYDROCODONE-ACETAMINOPHEN (NORCO) 7.5-325 MG PER TABLET    Take 1 tablet by mouth 2 (two) times daily as needed.    LIDOCAINE (LIDODERM) 5 %    APPLY 1 PATCH TO AFFECTED AREA, LEAVE ON FOR 12 HOURS THEN REMOVE AND LEAVE OFF FOR 12 HOURS    LISINOPRIL (PRINIVIL,ZESTRIL) 5 MG TABLET    Take 5 mg by mouth every evening.    LORATADINE (CLARITIN) 10 MG TABLET    Take 10 mg by mouth once daily.    MULTIVIT-MINERALS/FOLIC ACID (WOMEN'S MULTIVITAMIN GUMMIES ORAL)    Take by mouth once daily.    TEMAZEPAM (RESTORIL) 15 MG CAP    Take 15 mg by mouth every evening.    VITAMIN D (VITAMIN D3) 1000 UNITS TAB    Take 1,000 Units by mouth once daily.   Changed and/or Refilled Medications    Modified Medication Previous Medication    MONTELUKAST (SINGULAIR) 10 MG TABLET montelukast (SINGULAIR) 10 mg tablet       Take 1 tablet (10 mg total) by mouth every evening.    Take 10 mg by mouth every evening.   Discontinued Medications    DULAGLUTIDE (TRULICITY) 1.5 MG/0.5 ML PEN INJECTOR    Inject 1.5 mg into the skin every 7 days.    LANTUS SOLOSTAR U-100 INSULIN GLARGINE 100 UNITS/ML (3ML) SUBQ PEN    INJECT 32 UNITS UNDER THE SKIN ONCE A DAY        Type 2 diabetes mellitus with hyperglycemia, unspecified whether long term insulin use  -     POCT Glucose, Hand-Held Device  -     Hemoglobin A1C; Future; Expected date: 03/15/2022  -     semaglutide (OZEMPIC) 0.25 mg or 0.5 mg(2 mg/1.5 mL) pen injector; Inject 0.25 mg into the skin every 7 days.  Dispense: 1 pen; Refill: 1    Screening for cervical cancer  -     Cancel: Ambulatory referral/consult to Obstetrics / Gynecology; Future; Expected date: 03/22/2022  -     Ambulatory referral/consult to Obstetrics / Gynecology; Future; Expected date: 03/22/2022    Encounter for screening for malignant neoplasm of breast, unspecified screening modality  -     Mammo Digital Screening Bilat; Future; Expected date: 03/15/2022    Screening for colon cancer  -     Ambulatory referral/consult to General  Surgery; Future; Expected date: 03/22/2022    Allergic rhinitis, unspecified seasonality, unspecified trigger  -     montelukast (SINGULAIR) 10 mg tablet; Take 1 tablet (10 mg total) by mouth every evening.  Dispense: 90 tablet; Refill: 3    Chronic low back pain, unspecified back pain laterality, unspecified whether sciatica present  -     WHEELCHAIR FOR HOME USE    Obesity (BMI 30-39.9)    Neuropathy  -     WHEELCHAIR FOR HOME USE    Primary osteoarthritis of right shoulder    Back pain with history of spinal surgery  -     WHEELCHAIR FOR HOME USE         20-minute visit. 5 minutes spent counseling patient on diet, exercise, and weight loss.       Ozempic switched in place of  Trulicity due to insurance issues    Future Appointments   Date Time Provider Department Center   4/14/2022  1:30 PM Ruth Peters NP [facility] [address]   4/22/2022  7:00 AM [facility]  [address]   6/14/2022  8:20 AM Lise Powell MD [facility] [place]

## 2022-03-17 LAB — HBA1C MFR BLD: 5.8 % OF TOTAL HGB

## 2022-03-18 ENCOUNTER — TELEPHONE (OUTPATIENT)
Dept: PRIMARY CARE CLINIC | Facility: CLINIC | Age: 62
End: 2022-03-18
Payer: MEDICAID

## 2022-03-18 ENCOUNTER — TELEPHONE (OUTPATIENT)
Dept: SURGERY | Facility: CLINIC | Age: 62
End: 2022-03-18
Payer: MEDICAID

## 2022-03-18 DIAGNOSIS — Z12.11 COLON CANCER SCREENING: Primary | ICD-10-CM

## 2022-03-22 ENCOUNTER — TELEPHONE (OUTPATIENT)
Dept: PRIMARY CARE CLINIC | Facility: CLINIC | Age: 62
End: 2022-03-22
Payer: MEDICAID

## 2022-03-22 NOTE — TELEPHONE ENCOUNTER
Miguel's needs documentation about the need for the wheelchair in an office note.       ----- Message from Cornelia Marion sent at 3/22/2022  3:32 PM CDT -----  Contact: Emilia from [facility]  Emilia from [facility] called regarding a request sent over for medical records for patient. Please call [phone] ext [phone]

## 2022-03-26 RX ORDER — SEMAGLUTIDE 1.34 MG/ML
0.25 INJECTION, SOLUTION SUBCUTANEOUS
Qty: 1 PEN | Refills: 1 | Status: SHIPPED | OUTPATIENT
Start: 2022-03-26 | End: 2022-05-08 | Stop reason: ALTCHOICE

## 2022-03-28 ENCOUNTER — TELEPHONE (OUTPATIENT)
Dept: PRIMARY CARE CLINIC | Facility: CLINIC | Age: 62
End: 2022-03-28
Payer: MEDICAID

## 2022-03-28 PROBLEM — M54.50 CHRONIC LOW BACK PAIN: Status: ACTIVE | Noted: 2022-03-28

## 2022-03-28 PROBLEM — E66.9 OBESITY (BMI 30-39.9): Status: ACTIVE | Noted: 2022-03-28

## 2022-03-28 PROBLEM — G62.9 NEUROPATHY: Status: ACTIVE | Noted: 2022-03-28

## 2022-03-28 PROBLEM — G89.29 CHRONIC LOW BACK PAIN: Status: ACTIVE | Noted: 2022-03-28

## 2022-03-28 NOTE — TELEPHONE ENCOUNTER
The patient is advised to start the 0.25 for the first two weeks and then up the dose. She also states she has a new insurance that will begin and that insurance will pay for her trulicity. I advised her to make sure to give us a copy. Pt verbalized understanding.     ----- Message from Shana Shepard LPN sent at 3/28/2022  4:35 PM CDT -----  Contact: self    ----- Message -----  From: Snow Alvarez  Sent: 3/28/2022   4:29 PM CDT  To: Andre [facility] Staff    Requesting a call back regarding directions for medication. Please call back at [phone]

## 2022-03-28 NOTE — TELEPHONE ENCOUNTER
----- Message from Snow Alvarez sent at 3/28/2022  3:50 PM CDT -----  Contact: SAMMY  Requesting a call back regarding the medical order she received for wheel chair - notes on the order need to be added to the actual chart notes. Please call back at [phone]

## 2022-03-28 NOTE — TELEPHONE ENCOUNTER
----- Message from Renee Telles sent at 3/28/2022  9:30 AM CDT -----  Regarding: Wheel Chair  Contact: Praveen Hunter  Per phone call with Dale, she stated that she is needing the reason for Wheel chair and it is not in the medical records.  Please return call at [phone] ext [phone] or fax's to [phone].    Thanks,  SJ

## 2022-04-21 ENCOUNTER — OFFICE VISIT (OUTPATIENT)
Dept: PRIMARY CARE CLINIC | Facility: CLINIC | Age: 62
End: 2022-04-21
Payer: MEDICARE

## 2022-04-21 VITALS
DIASTOLIC BLOOD PRESSURE: 64 MMHG | OXYGEN SATURATION: 96 % | SYSTOLIC BLOOD PRESSURE: 92 MMHG | HEART RATE: 87 BPM | HEIGHT: 60 IN | WEIGHT: 184 LBS | BODY MASS INDEX: 36.12 KG/M2

## 2022-04-21 DIAGNOSIS — M54.50 CHRONIC LOW BACK PAIN, UNSPECIFIED BACK PAIN LATERALITY, UNSPECIFIED WHETHER SCIATICA PRESENT: ICD-10-CM

## 2022-04-21 DIAGNOSIS — R11.0 NAUSEA: ICD-10-CM

## 2022-04-21 DIAGNOSIS — E66.9 OBESITY (BMI 30-39.9): ICD-10-CM

## 2022-04-21 DIAGNOSIS — E11.65 TYPE 2 DIABETES MELLITUS WITH HYPERGLYCEMIA, UNSPECIFIED WHETHER LONG TERM INSULIN USE: Primary | ICD-10-CM

## 2022-04-21 DIAGNOSIS — G89.29 CHRONIC LOW BACK PAIN, UNSPECIFIED BACK PAIN LATERALITY, UNSPECIFIED WHETHER SCIATICA PRESENT: ICD-10-CM

## 2022-04-21 PROCEDURE — 3074F SYST BP LT 130 MM HG: CPT | Mod: CPTII,S$GLB,, | Performed by: INTERNAL MEDICINE

## 2022-04-21 PROCEDURE — 3008F BODY MASS INDEX DOCD: CPT | Mod: CPTII,S$GLB,, | Performed by: INTERNAL MEDICINE

## 2022-04-21 PROCEDURE — 99214 PR OFFICE/OUTPT VISIT, EST, LEVL IV, 30-39 MIN: ICD-10-PCS | Mod: S$GLB,,, | Performed by: INTERNAL MEDICINE

## 2022-04-21 PROCEDURE — 3078F DIAST BP <80 MM HG: CPT | Mod: CPTII,S$GLB,, | Performed by: INTERNAL MEDICINE

## 2022-04-21 PROCEDURE — 3078F PR MOST RECENT DIASTOLIC BLOOD PRESSURE < 80 MM HG: ICD-10-PCS | Mod: CPTII,S$GLB,, | Performed by: INTERNAL MEDICINE

## 2022-04-21 PROCEDURE — 99214 OFFICE O/P EST MOD 30 MIN: CPT | Mod: S$GLB,,, | Performed by: INTERNAL MEDICINE

## 2022-04-21 PROCEDURE — 3008F PR BODY MASS INDEX (BMI) DOCUMENTED: ICD-10-PCS | Mod: CPTII,S$GLB,, | Performed by: INTERNAL MEDICINE

## 2022-04-21 PROCEDURE — 3044F HG A1C LEVEL LT 7.0%: CPT | Mod: CPTII,S$GLB,, | Performed by: INTERNAL MEDICINE

## 2022-04-21 PROCEDURE — 3044F PR MOST RECENT HEMOGLOBIN A1C LEVEL <7.0%: ICD-10-PCS | Mod: CPTII,S$GLB,, | Performed by: INTERNAL MEDICINE

## 2022-04-21 PROCEDURE — 3074F PR MOST RECENT SYSTOLIC BLOOD PRESSURE < 130 MM HG: ICD-10-PCS | Mod: CPTII,S$GLB,, | Performed by: INTERNAL MEDICINE

## 2022-04-21 NOTE — PROGRESS NOTES
"Subjective:      Patient ID: Avelina King is a 61 y.o. female.    Chief Complaint: Dizziness (Started on Wed of last week. She On sitting up in bed, "I have to sit for minutes before moving." On laying down in the bed it is spinning. She is using a wheelchair to make sure she does not have a fall outside of the house. She also test her BS when dizzy with 102 or 97 result.  )    HPI       Patient here with above complaints. She is still on too many medications. She is on hydrocodone, klonopin, cymbalta, gabapentin and anti histamines. She states she eats around 7 pm and she has breakfast around 10 am. Her daughter/grandaughter who lives with her goes to work in the am and she gives her some cheese and fruit in the am. Patient states when she wakes up she takes her medications and the room spins and she feels light headed    Review of Systems   Constitutional: Negative for chills and fever.   Respiratory: Negative for cough, shortness of breath and wheezing.    Cardiovascular: Negative for chest pain, palpitations and leg swelling.   Genitourinary: Negative for dysuria, frequency and urgency.   Musculoskeletal: Positive for back pain. Negative for falls.        Chronic   Skin: Negative for rash.   Neurological: Positive for dizziness. Negative for sensory change, speech change, focal weakness and headaches.   Psychiatric/Behavioral: Negative for substance abuse and suicidal ideas. The patient is nervous/anxious.      Objective:     Physical Exam  Vitals reviewed.   Constitutional:       Appearance: Normal appearance. She is obese.   HENT:      Head: Normocephalic.   Eyes:      Extraocular Movements: Extraocular movements intact.      Conjunctiva/sclera: Conjunctivae normal.      Pupils: Pupils are equal, round, and reactive to light.   Neck:      Vascular: No carotid bruit.   Cardiovascular:      Rate and Rhythm: Normal rate and regular rhythm.   Pulmonary:      Effort: Pulmonary effort is normal.      Breath " sounds: Normal breath sounds.   Musculoskeletal:      Right lower leg: No edema.      Left lower leg: No edema.   Skin:     General: Skin is warm.      Capillary Refill: Capillary refill takes less than 2 seconds.   Neurological:      General: No focal deficit present.      Mental Status: She is alert and oriented to person, place, and time.   Psychiatric:         Mood and Affect: Mood normal.        BP 92/64 (BP Location: Left arm, Patient Position: Sitting, BP Method: Medium (Manual))   Pulse 87   Ht 5' (1.524 m)   Wt 83.5 kg (184 lb)   SpO2 96%   BMI 35.94 kg/m²     Assessment:       ICD-10-CM ICD-9-CM   1. Type 2 diabetes mellitus with hyperglycemia, unspecified whether long term insulin use  E11.65 250.00   2. Obesity (BMI 30-39.9)  E66.9 278.00   3. Nausea  R11.0 787.02   4. Chronic low back pain, unspecified back pain laterality, unspecified whether sciatica present  M54.50 724.2    G89.29 338.29       Plan:     Medication List with Changes/Refills   Current Medications    ASCORBIC ACID, VITAMIN C, (VITAMIN C) 1000 MG TABLET    Take 1,000 mg by mouth once daily.    ATORVASTATIN (LIPITOR) 40 MG TABLET    Take 1 tablet (40 mg total) by mouth every evening.    CALCIUM-VITAMIN D3-VITAMIN K 650 MG-12.5 MCG-40 MCG CHEW    Take by mouth every evening.    CARTILAGE/COLLAGEN II/HYALURON (MOVE FREE ULTRA ORAL)    Take 1 tablet by mouth once daily.    CHROMIUM PICOLINATE 1,000 MCG TAB    Take 1 tablet by mouth once daily.    DULOXETINE (CYMBALTA) 60 MG CAPSULE    Take 120 mg by mouth every morning.    GABAPENTIN (NEURONTIN) 800 MG TABLET    TAKE 1 TABLET BY MOUTH 2 TIMES DAILY.    HYDROCODONE-ACETAMINOPHEN (NORCO) 7.5-325 MG PER TABLET    Take 1 tablet by mouth 2 (two) times daily as needed.    LIDOCAINE (LIDODERM) 5 %    APPLY 1 PATCH TO AFFECTED AREA, LEAVE ON FOR 12 HOURS THEN REMOVE AND LEAVE OFF FOR 12 HOURS    LORATADINE (CLARITIN) 10 MG TABLET    Take 10 mg by mouth once daily.    MONTELUKAST (SINGULAIR) 10  MG TABLET    Take 1 tablet (10 mg total) by mouth every evening.    MULTIVIT-MINERALS/FOLIC ACID (WOMEN'S MULTIVITAMIN GUMMIES ORAL)    Take by mouth once daily.    SEMAGLUTIDE (OZEMPIC) 0.25 MG OR 0.5 MG(2 MG/1.5 ML) PEN INJECTOR    Inject 0.25 mg into the skin every 7 days.    VITAMIN D (VITAMIN D3) 1000 UNITS TAB    Take 1,000 Units by mouth once daily.   Discontinued Medications    CYCLOBENZAPRINE (FLEXERIL) 10 MG TABLET    Take 10 mg by mouth 2 (two) times daily.    LISINOPRIL (PRINIVIL,ZESTRIL) 5 MG TABLET    Take 5 mg by mouth every evening.    TEMAZEPAM (RESTORIL) 15 MG CAP    Take 15 mg by mouth every evening.        Type 2 diabetes mellitus with hyperglycemia, unspecified whether long term insulin use  -     POCT Glucose, Hand-Held Device    Obesity (BMI 30-39.9)    Nausea    Chronic low back pain, unspecified back pain laterality, unspecified whether sciatica present       Hold lisinopril due to low BP, she needs to slowly taper her medications as she is over medicated. She needs to eat something either as a late night snack or early morning. She can get a refrigerator for her room and maybe keep some protein shakes as she needs help preparing food etc. Recommend a lot of hydration. Due to dizziness/lightheadedness she had to cancel her colonoscopy and will need to reschedule      Needs to schedule mammogram    Future Appointments   Date Time Provider Department Center   6/14/2022  8:20 AM [facility] [place]

## 2022-05-18 ENCOUNTER — TELEPHONE (OUTPATIENT)
Dept: PRIMARY CARE CLINIC | Facility: CLINIC | Age: 62
End: 2022-05-18
Payer: MEDICAID

## 2022-05-18 NOTE — TELEPHONE ENCOUNTER
pts ins stated no PA needed for her Trulicity--[facility]--stated she picked up from a pharmacy on 05/16/22

## 2022-07-06 ENCOUNTER — TELEPHONE (OUTPATIENT)
Dept: PRIMARY CARE CLINIC | Facility: CLINIC | Age: 62
End: 2022-07-06
Payer: MEDICAID

## 2022-07-06 NOTE — TELEPHONE ENCOUNTER
"She does not have SOB and says the swelling is "not bad as my  has it"  and wants to wait for Dr Powell to advise this am.       ----- Message from Shana Shepard LPN sent at 7/6/2022  4:06 PM CDT -----  Contact: self    ----- Message -----  From: Snow Alvarez  Sent: 7/6/2022   4:03 PM CDT  To: [facility] Staff    Type:  Needs Medical Advice    Who Called:  Avelina King   Symptoms (please be specific):  swollen ankles/feet, weight loss, blood pressure 141/91    Would the patient rather a call back or a response via [facility]? Call back   Best Call Back Number:  [phone]   Additional Information: Would like a call back from the nurse to advise her on if she should resume her blood pressure medication or if she needs to come in for an appt        "

## 2022-07-07 ENCOUNTER — TELEPHONE (OUTPATIENT)
Dept: PRIMARY CARE CLINIC | Facility: CLINIC | Age: 62
End: 2022-07-07
Payer: MEDICAID

## 2022-07-07 NOTE — TELEPHONE ENCOUNTER
Pt states she will take her BP med for today and inform us if her swelling or BP is still high. She was given the 130/80 and below is good for her per Dr Powell.       ----- Message from Snow Alvarez sent at 7/7/2022  3:01 PM CDT -----  Contact: self  Type:  Patient Returning Call    Who Called: Avelina King   Who Left Message for Patient: Chastity  Does the patient know what this is regarding?: She requested a call back for patient advice on blood pressure and swollen ankles/feet - says she did not receive any voicemail.  Would the patient rather a call back or a response via [facility]?  Call back   Best Call Back Number: [phone]   Additional Information: n/a

## 2022-07-12 ENCOUNTER — TELEPHONE (OUTPATIENT)
Dept: PRIMARY CARE CLINIC | Facility: CLINIC | Age: 62
End: 2022-07-12
Payer: MEDICAID

## 2022-07-12 NOTE — TELEPHONE ENCOUNTER
----- Message from Milagros Farfan sent at 7/12/2022  3:53 PM CDT -----  Type:  RX Refill Request    Who Called: Avelina King  Refill or New Rx: Refill  RX Name and Strength: Lisinopril 5 mg   How is the patient currently taking it? (ex. 1XDay): 1xDay  Is this a 30 day or 90 day RX: 90 day   Preferred Pharmacy with phone number:   [facility] (Now [facility]) - [facility] [address]  [address] [address]  Phone: [phone] Fax: [phone]    Local or Mail Order: Local   Ordering Provider: Lise Powell   Would the patient rather a call back or a response via [facility]? Call back   Best Call Back Number: [phone] (home)     Additional Information: Pt said her blood pressure is going back up and was told to start back taking this medication and she is almost completely out.    She said please send it to the mail delivery pharmacy.

## 2022-07-13 DIAGNOSIS — I10 HYPERTENSION, UNSPECIFIED TYPE: Primary | ICD-10-CM

## 2022-07-13 RX ORDER — LISINOPRIL 5 MG/1
5 TABLET ORAL DAILY
Qty: 90 TABLET | Refills: 3 | Status: SHIPPED | OUTPATIENT
Start: 2022-07-13 | End: 2022-09-06 | Stop reason: SDUPTHER

## 2022-08-01 ENCOUNTER — NURSE TRIAGE (OUTPATIENT)
Dept: ADMINISTRATIVE | Facility: CLINIC | Age: 62
End: 2022-08-01
Payer: MEDICAID

## 2022-08-01 NOTE — TELEPHONE ENCOUNTER
Pt calling stating that her diastolic has been elevated despite being back on the lisinopril like they told her to do. Unable to get current b/p as pt is at work today but b/p last night was 121/96. Pt state she does get HA daily. Per protocol advised to be seen within 2 weeks. verbalized understanding. Attempted to make pt an earlier appt as she has one in 3 weeks but nothing available sooner. Will route message to PCP office. verbalized understanding . Denies any further questions or concerns at this time, advised to call back if they have any that come up. Advised pt to call back with any other concerns or worsening symptoms. Verbalized understanding and will route message to provider.       Reason for Disposition   Systolic BP >= 130 OR Diastolic >= 80, and is taking BP medications    Additional Information   Negative: Sounds like a life-threatening emergency to the triager   Negative: Pregnant 20 or more weeks or postpartum (< 6 weeks after delivery) with new hand or face swelling   Negative: Pregnant 20 or more weeks or postpartum with Systolic BP >= 140 OR Diastolic >= 90   Negative: Systolic BP >= 160 OR Diastolic >= 100, and any cardiac or neurologic symptoms (e.g., chest pain, difficulty breathing, unsteady gait, blurred vision)   Negative: Patient sounds very sick or weak to the triager   Negative: Systolic BP >= 200 OR Diastolic >= 120 and having NO cardiac or neurologic symptoms   Negative: Systolic BP >= 180 OR Diastolic >= 110, and missed most recent dose of blood pressure medication   Negative: Systolic BP >= 180 OR Diastolic >= 110   Negative: Patient wants to be seen   Negative: Ran out of BP medications   Negative: Taking BP medications and feels is having side effects (e.g., impotence, cough, dizziness)   Negative: Systolic BP >= 160 OR Diastolic >= 100   Negative: Systolic BP >= 130 OR Diastolic >= 80, and pregnant    Protocols used: BLOOD PRESSURE - HIGH-A-OH

## 2022-08-24 ENCOUNTER — OFFICE VISIT (OUTPATIENT)
Dept: PRIMARY CARE CLINIC | Facility: CLINIC | Age: 62
End: 2022-08-24
Payer: MEDICARE

## 2022-08-24 VITALS
HEART RATE: 85 BPM | HEIGHT: 60 IN | WEIGHT: 183.81 LBS | SYSTOLIC BLOOD PRESSURE: 132 MMHG | OXYGEN SATURATION: 99 % | BODY MASS INDEX: 36.08 KG/M2 | DIASTOLIC BLOOD PRESSURE: 81 MMHG

## 2022-08-24 DIAGNOSIS — M54.50 CHRONIC LOW BACK PAIN, UNSPECIFIED BACK PAIN LATERALITY, UNSPECIFIED WHETHER SCIATICA PRESENT: ICD-10-CM

## 2022-08-24 DIAGNOSIS — E11.65 TYPE 2 DIABETES MELLITUS WITH HYPERGLYCEMIA, UNSPECIFIED WHETHER LONG TERM INSULIN USE: Primary | ICD-10-CM

## 2022-08-24 DIAGNOSIS — E66.9 OBESITY (BMI 30-39.9): ICD-10-CM

## 2022-08-24 DIAGNOSIS — I10 HYPERTENSION, UNSPECIFIED TYPE: ICD-10-CM

## 2022-08-24 DIAGNOSIS — G89.29 CHRONIC LOW BACK PAIN, UNSPECIFIED BACK PAIN LATERALITY, UNSPECIFIED WHETHER SCIATICA PRESENT: ICD-10-CM

## 2022-08-24 DIAGNOSIS — N18.31 STAGE 3A CHRONIC KIDNEY DISEASE: ICD-10-CM

## 2022-08-24 DIAGNOSIS — E66.01 SEVERE OBESITY (BMI 35.0-39.9) WITH COMORBIDITY: ICD-10-CM

## 2022-08-24 PROCEDURE — 3075F PR MOST RECENT SYSTOLIC BLOOD PRESS GE 130-139MM HG: ICD-10-PCS | Mod: CPTII,S$GLB,, | Performed by: INTERNAL MEDICINE

## 2022-08-24 PROCEDURE — 3075F SYST BP GE 130 - 139MM HG: CPT | Mod: CPTII,S$GLB,, | Performed by: INTERNAL MEDICINE

## 2022-08-24 PROCEDURE — 3008F BODY MASS INDEX DOCD: CPT | Mod: CPTII,S$GLB,, | Performed by: INTERNAL MEDICINE

## 2022-08-24 PROCEDURE — 3079F DIAST BP 80-89 MM HG: CPT | Mod: CPTII,S$GLB,, | Performed by: INTERNAL MEDICINE

## 2022-08-24 PROCEDURE — 1159F PR MEDICATION LIST DOCUMENTED IN MEDICAL RECORD: ICD-10-PCS | Mod: CPTII,S$GLB,, | Performed by: INTERNAL MEDICINE

## 2022-08-24 PROCEDURE — 3008F PR BODY MASS INDEX (BMI) DOCUMENTED: ICD-10-PCS | Mod: CPTII,S$GLB,, | Performed by: INTERNAL MEDICINE

## 2022-08-24 PROCEDURE — 4010F ACE/ARB THERAPY RXD/TAKEN: CPT | Mod: CPTII,S$GLB,, | Performed by: INTERNAL MEDICINE

## 2022-08-24 PROCEDURE — 3079F PR MOST RECENT DIASTOLIC BLOOD PRESSURE 80-89 MM HG: ICD-10-PCS | Mod: CPTII,S$GLB,, | Performed by: INTERNAL MEDICINE

## 2022-08-24 PROCEDURE — 1159F MED LIST DOCD IN RCRD: CPT | Mod: CPTII,S$GLB,, | Performed by: INTERNAL MEDICINE

## 2022-08-24 PROCEDURE — 4010F PR ACE/ARB THEARPY RXD/TAKEN: ICD-10-PCS | Mod: CPTII,S$GLB,, | Performed by: INTERNAL MEDICINE

## 2022-08-24 PROCEDURE — 99214 PR OFFICE/OUTPT VISIT, EST, LEVL IV, 30-39 MIN: ICD-10-PCS | Mod: S$GLB,,, | Performed by: INTERNAL MEDICINE

## 2022-08-24 PROCEDURE — 3044F PR MOST RECENT HEMOGLOBIN A1C LEVEL <7.0%: ICD-10-PCS | Mod: CPTII,S$GLB,, | Performed by: INTERNAL MEDICINE

## 2022-08-24 PROCEDURE — 3044F HG A1C LEVEL LT 7.0%: CPT | Mod: CPTII,S$GLB,, | Performed by: INTERNAL MEDICINE

## 2022-08-24 PROCEDURE — 99214 OFFICE O/P EST MOD 30 MIN: CPT | Mod: S$GLB,,, | Performed by: INTERNAL MEDICINE

## 2022-08-24 NOTE — PROGRESS NOTES
"Subjective:      Patient ID: Avelina King is a 62 y.o. female.    Chief Complaint: Follow-up (She states her "bottom number has been running high-95 and goes to 98 as the highest. Then I started taking my BP med again." )    HPI     Dr Vasquez last surgery in December and states she does not need further surgeries also had surgery with Dr Luz in the past. Able to walk without any support. Requesting a handicap placard      Review of Systems   Constitutional:  Negative for chills and fever.   Respiratory:  Negative for cough, shortness of breath and wheezing.    Cardiovascular:  Negative for chest pain, palpitations and leg swelling.   Gastrointestinal:  Negative for abdominal pain, constipation, diarrhea, nausea and vomiting.   Musculoskeletal:  Positive for back pain. Negative for falls.        Chronic   Neurological:  Negative for dizziness.   Endo/Heme/Allergies:  Does not bruise/bleed easily.   Psychiatric/Behavioral:  Negative for depression, substance abuse and suicidal ideas. The patient is nervous/anxious.    Objective:     Physical Exam  Vitals reviewed.   Constitutional:       Appearance: Normal appearance. She is obese.   HENT:      Head: Normocephalic.   Eyes:      Extraocular Movements: Extraocular movements intact.      Conjunctiva/sclera: Conjunctivae normal.      Pupils: Pupils are equal, round, and reactive to light.   Cardiovascular:      Rate and Rhythm: Normal rate and regular rhythm.   Pulmonary:      Effort: Pulmonary effort is normal.   Abdominal:      General: Bowel sounds are normal.   Musculoskeletal:      Right lower leg: No edema.      Left lower leg: No edema.   Skin:     General: Skin is warm.      Capillary Refill: Capillary refill takes less than 2 seconds.   Neurological:      General: No focal deficit present.      Mental Status: She is alert and oriented to person, place, and time.   Psychiatric:         Mood and Affect: Mood normal.      /81 (BP Location: Right arm, " Patient Position: Sitting, BP Method: Medium (Automatic))   Pulse 85   Ht 5' (1.524 m)   Wt 83.4 kg (183 lb 12.8 oz)   SpO2 99%   BMI 35.90 kg/m²     Assessment:       ICD-10-CM ICD-9-CM   1. Type 2 diabetes mellitus with hyperglycemia, unspecified whether long term insulin use  E11.65 250.00   2. Severe obesity (BMI 35.0-39.9) with comorbidity  E66.01 278.01   3. Obesity (BMI 30-39.9)  E66.9 278.00   4. Chronic low back pain, unspecified back pain laterality, unspecified whether sciatica present  M54.50 724.2    G89.29 338.29   5. Stage 3a chronic kidney disease  N18.31 585.3   6. Hypertension, unspecified type  I10 401.9       Plan:     Medication List with Changes/Refills   Current Medications    ASCORBIC ACID, VITAMIN C, (VITAMIN C) 1000 MG TABLET    Take 1,000 mg by mouth once daily.    ATORVASTATIN (LIPITOR) 40 MG TABLET    Take 1 tablet (40 mg total) by mouth once daily.    CALCIUM-VITAMIN D3-VITAMIN K 650 MG-12.5 MCG-40 MCG CHEW    Take by mouth every evening.    CARTILAGE/COLLAGEN II/HYALURON (MOVE FREE ULTRA ORAL)    Take 1 tablet by mouth once daily.    CHROMIUM PICOLINATE 1,000 MCG TAB    Take 1 tablet by mouth once daily.    DULOXETINE (CYMBALTA) 60 MG CAPSULE    Take 120 mg by mouth every morning.    GABAPENTIN (NEURONTIN) 800 MG TABLET    TAKE 1 TABLET BY MOUTH 2 TIMES DAILY.    HYDROCODONE-ACETAMINOPHEN (NORCO) 7.5-325 MG PER TABLET    Take 1 tablet by mouth 2 (two) times daily as needed.    LIDOCAINE (LIDODERM) 5 %    APPLY 1 PATCH TO AFFECTED AREA, LEAVE ON FOR 12 HOURS THEN REMOVE AND LEAVE OFF FOR 12 HOURS    LISINOPRIL (PRINIVIL,ZESTRIL) 5 MG TABLET    Take 1 tablet (5 mg total) by mouth once daily.    LORATADINE (CLARITIN) 10 MG TABLET    Take 10 mg by mouth once daily.    MONTELUKAST (SINGULAIR) 10 MG TABLET    Take 1 tablet (10 mg total) by mouth every evening.    MULTIVIT-MINERALS/FOLIC ACID (WOMEN'S MULTIVITAMIN GUMMIES ORAL)    Take by mouth once daily.    ONDANSETRON (ZOFRAN) 4 MG  TABLET    TAKE 1 TABLET BY MOUTH EVERY 8 HOURS AS NEEDED    TRULICITY 1.5 MG/0.5 ML PEN INJECTOR    INJECT 1.5MG (1 PEN) SUBCUTANEOUSLY EVERY 7 DAYS    VITAMIN D (VITAMIN D3) 1000 UNITS TAB    Take 1,000 Units by mouth once daily.        Type 2 diabetes mellitus with hyperglycemia, unspecified whether long term insulin use  -     POCT Glucose, Hand-Held Device  -     Hemoglobin A1C; Future; Expected date: 08/24/2022  -     Basic Metabolic Panel; Future; Expected date: 08/24/2022    Severe obesity (BMI 35.0-39.9) with comorbidity    Obesity (BMI 30-39.9)    Chronic low back pain, unspecified back pain laterality, unspecified whether sciatica present    Stage 3a chronic kidney disease    Hypertension, unspecified type       Future Appointments   Date Time Provider Department Center   11/30/2022  9:40 AM [facility]    Referrals have been placed to Gen surgery for screening colonoscopy and gyn for cervical cancer screening. She needs to call departments to schedule appointments

## 2022-08-25 LAB
BUN SERPL-MCNC: 42 MG/DL (ref 7–25)
BUN/CREAT SERPL: 32 (CALC) (ref 6–22)
CALCIUM SERPL-MCNC: 9.8 MG/DL (ref 8.6–10.4)
CHLORIDE SERPL-SCNC: 99 MMOL/L (ref 98–110)
CO2 SERPL-SCNC: 29 MMOL/L (ref 20–32)
CREAT SERPL-MCNC: 1.3 MG/DL (ref 0.5–1.05)
EGFR: 46 ML/MIN/1.73M2
GLUCOSE SERPL-MCNC: 121 MG/DL (ref 65–139)
HBA1C MFR BLD: 5.8 % OF TOTAL HGB
POTASSIUM SERPL-SCNC: 4.8 MMOL/L (ref 3.5–5.3)
SODIUM SERPL-SCNC: 137 MMOL/L (ref 135–146)

## 2022-08-28 PROBLEM — E66.01 SEVERE OBESITY (BMI 35.0-39.9) WITH COMORBIDITY: Status: ACTIVE | Noted: 2022-08-28

## 2022-09-06 DIAGNOSIS — G62.9 NEUROPATHY: ICD-10-CM

## 2022-09-06 DIAGNOSIS — I10 HYPERTENSION, UNSPECIFIED TYPE: ICD-10-CM

## 2022-09-07 RX ORDER — GABAPENTIN 800 MG/1
800 TABLET ORAL 2 TIMES DAILY
Qty: 180 TABLET | Refills: 2 | Status: SHIPPED | OUTPATIENT
Start: 2022-09-07

## 2022-09-07 RX ORDER — LISINOPRIL 5 MG/1
5 TABLET ORAL DAILY
Qty: 90 TABLET | Refills: 3 | Status: SHIPPED | OUTPATIENT
Start: 2022-09-07 | End: 2023-09-07

## 2022-10-13 ENCOUNTER — TELEPHONE (OUTPATIENT)
Dept: PRIMARY CARE CLINIC | Facility: CLINIC | Age: 62
End: 2022-10-13

## 2022-10-13 NOTE — TELEPHONE ENCOUNTER
----- Message from Angeli Enrique sent at 10/13/2022 10:45 AM CDT -----  Contact: Patient  Patient need to schedule for a flu shot      Please call patient and advise if you all have the flu shot       #  [phone]

## 2022-10-26 ENCOUNTER — TELEPHONE (OUTPATIENT)
Dept: PRIMARY CARE CLINIC | Facility: CLINIC | Age: 62
End: 2022-10-26
Payer: MEDICAID

## 2022-10-26 DIAGNOSIS — E11.65 TYPE 2 DIABETES MELLITUS WITH HYPERGLYCEMIA, UNSPECIFIED WHETHER LONG TERM INSULIN USE: Primary | ICD-10-CM

## 2022-10-26 RX ORDER — INSULIN PUMP SYRINGE, 3 ML
EACH MISCELLANEOUS
Qty: 1 EACH | Refills: 0 | Status: SHIPPED | OUTPATIENT
Start: 2022-10-26 | End: 2023-10-26

## 2022-10-26 RX ORDER — LANCETS
1 EACH MISCELLANEOUS DAILY
Qty: 90 EACH | Refills: 0 | Status: SHIPPED | OUTPATIENT
Start: 2022-10-26

## 2022-10-26 NOTE — TELEPHONE ENCOUNTER
----- Message from Deana Carlisle sent at 10/26/2022 11:44 AM CDT -----  Contact: [facility]  .Type:  Patient Returning Call    Who Called:mandeep  Who Left Message for Patient:  Does the patient know what this is regarding?:requesting order for diabetic supplies   Would the patient rather a call back or a response via [facility]?   Best Call Back Number:[phone]  [phone]  Additional Information:

## 2022-11-03 DIAGNOSIS — E11.65 TYPE 2 DIABETES MELLITUS WITH HYPERGLYCEMIA, UNSPECIFIED WHETHER LONG TERM INSULIN USE: ICD-10-CM

## 2022-11-03 RX ORDER — ATORVASTATIN CALCIUM 40 MG/1
40 TABLET, FILM COATED ORAL DAILY
Qty: 90 TABLET | Refills: 3 | Status: SHIPPED | OUTPATIENT
Start: 2022-11-03 | End: 2023-10-20

## 2022-11-30 ENCOUNTER — OFFICE VISIT (OUTPATIENT)
Dept: PRIMARY CARE CLINIC | Facility: CLINIC | Age: 62
End: 2022-11-30
Payer: MEDICARE

## 2022-11-30 VITALS
WEIGHT: 186 LBS | BODY MASS INDEX: 36.52 KG/M2 | HEART RATE: 78 BPM | OXYGEN SATURATION: 96 % | DIASTOLIC BLOOD PRESSURE: 77 MMHG | SYSTOLIC BLOOD PRESSURE: 118 MMHG | HEIGHT: 60 IN

## 2022-11-30 DIAGNOSIS — E11.65 TYPE 2 DIABETES MELLITUS WITH HYPERGLYCEMIA, UNSPECIFIED WHETHER LONG TERM INSULIN USE: ICD-10-CM

## 2022-11-30 DIAGNOSIS — Z11.59 NEED FOR HEPATITIS C SCREENING TEST: Primary | ICD-10-CM

## 2022-11-30 DIAGNOSIS — Z12.4 SCREENING FOR CERVICAL CANCER: ICD-10-CM

## 2022-11-30 DIAGNOSIS — Z12.11 SCREENING FOR COLON CANCER: ICD-10-CM

## 2022-11-30 PROCEDURE — 4010F ACE/ARB THERAPY RXD/TAKEN: CPT | Mod: CPTII,S$GLB,, | Performed by: INTERNAL MEDICINE

## 2022-11-30 PROCEDURE — 1159F MED LIST DOCD IN RCRD: CPT | Mod: CPTII,S$GLB,, | Performed by: INTERNAL MEDICINE

## 2022-11-30 PROCEDURE — 3074F SYST BP LT 130 MM HG: CPT | Mod: CPTII,S$GLB,, | Performed by: INTERNAL MEDICINE

## 2022-11-30 PROCEDURE — 99214 PR OFFICE/OUTPT VISIT, EST, LEVL IV, 30-39 MIN: ICD-10-PCS | Mod: S$GLB,,, | Performed by: INTERNAL MEDICINE

## 2022-11-30 PROCEDURE — 3078F PR MOST RECENT DIASTOLIC BLOOD PRESSURE < 80 MM HG: ICD-10-PCS | Mod: CPTII,S$GLB,, | Performed by: INTERNAL MEDICINE

## 2022-11-30 PROCEDURE — 3044F PR MOST RECENT HEMOGLOBIN A1C LEVEL <7.0%: ICD-10-PCS | Mod: CPTII,S$GLB,, | Performed by: INTERNAL MEDICINE

## 2022-11-30 PROCEDURE — 3074F PR MOST RECENT SYSTOLIC BLOOD PRESSURE < 130 MM HG: ICD-10-PCS | Mod: CPTII,S$GLB,, | Performed by: INTERNAL MEDICINE

## 2022-11-30 PROCEDURE — 3044F HG A1C LEVEL LT 7.0%: CPT | Mod: CPTII,S$GLB,, | Performed by: INTERNAL MEDICINE

## 2022-11-30 PROCEDURE — 4010F PR ACE/ARB THEARPY RXD/TAKEN: ICD-10-PCS | Mod: CPTII,S$GLB,, | Performed by: INTERNAL MEDICINE

## 2022-11-30 PROCEDURE — 3078F DIAST BP <80 MM HG: CPT | Mod: CPTII,S$GLB,, | Performed by: INTERNAL MEDICINE

## 2022-11-30 PROCEDURE — 3008F BODY MASS INDEX DOCD: CPT | Mod: CPTII,S$GLB,, | Performed by: INTERNAL MEDICINE

## 2022-11-30 PROCEDURE — 1159F PR MEDICATION LIST DOCUMENTED IN MEDICAL RECORD: ICD-10-PCS | Mod: CPTII,S$GLB,, | Performed by: INTERNAL MEDICINE

## 2022-11-30 PROCEDURE — 99214 OFFICE O/P EST MOD 30 MIN: CPT | Mod: S$GLB,,, | Performed by: INTERNAL MEDICINE

## 2022-11-30 PROCEDURE — 3008F PR BODY MASS INDEX (BMI) DOCUMENTED: ICD-10-PCS | Mod: CPTII,S$GLB,, | Performed by: INTERNAL MEDICINE

## 2022-11-30 RX ORDER — CLONAZEPAM 0.5 MG/1
TABLET ORAL
COMMUNITY
Start: 2022-10-17

## 2022-11-30 RX ORDER — CYCLOBENZAPRINE HCL 10 MG
10 TABLET ORAL 2 TIMES DAILY
COMMUNITY
Start: 2022-10-05 | End: 2023-04-20 | Stop reason: ALTCHOICE

## 2022-11-30 NOTE — PROGRESS NOTES
Subjective:      Patient ID: Avelina King is a 62 y.o. female.    Chief Complaint: Follow-up    HPI    Past Medical History:   Diagnosis Date    Anxiety     At high risk for falls     Depression     Diabetes 1.5, managed as type 2     Fall     hip injury, will need surgery    High cholesterol     Hypertension     Lower extremity weakness     left knee    Vitamin C deficiency        Patient is here and states she is more active and her legs are stronger, she fell once since I last saw her and she states it was xrayed and it is not fractured.   She states she is not getting any more back surgeries. Her neurosurgeon is Dr Vasquez and she is recovering well from her previous surgery.   She has cut back on pain medicine and BZDs            Review of Systems   Constitutional:  Negative for chills, fever and weight loss.   Respiratory:  Negative for cough, shortness of breath and wheezing.    Cardiovascular:  Negative for chest pain and palpitations.   Genitourinary:  Negative for dysuria, frequency and urgency.   Musculoskeletal:  Positive for back pain and falls.        Improving    Neurological:  Negative for dizziness and headaches.   Psychiatric/Behavioral:  Positive for depression. Negative for substance abuse and suicidal ideas. The patient is nervous/anxious.    Objective:     Physical Exam  Vitals reviewed.   Constitutional:       Appearance: Normal appearance. She is obese.   HENT:      Head: Normocephalic.   Eyes:      Extraocular Movements: Extraocular movements intact.      Conjunctiva/sclera: Conjunctivae normal.      Pupils: Pupils are equal, round, and reactive to light.   Cardiovascular:      Rate and Rhythm: Normal rate and regular rhythm.   Pulmonary:      Effort: Pulmonary effort is normal.      Breath sounds: Normal breath sounds.   Abdominal:      General: Bowel sounds are normal.   Musculoskeletal:      Right lower leg: No edema.      Left lower leg: No edema.   Skin:     General: Skin is warm.       Capillary Refill: Capillary refill takes less than 2 seconds.   Neurological:      General: No focal deficit present.      Mental Status: She is alert and oriented to person, place, and time.   Psychiatric:         Mood and Affect: Mood normal.     /77 (BP Location: Left arm, Patient Position: Sitting, BP Method: Medium (Automatic))   Pulse 78   Ht 5' (1.524 m)   Wt 84.4 kg (186 lb)   SpO2 96%   BMI 36.33 kg/m²     Assessment:       ICD-10-CM ICD-9-CM   1. Need for hepatitis C screening test  Z11.59 V73.89   2. Type 2 diabetes mellitus with hyperglycemia, unspecified whether long term insulin use  E11.65 250.00   3. Screening for colon cancer  Z12.11 V76.51   4. Screening for cervical cancer  Z12.4 V76.2       Plan:     Medication List with Changes/Refills   Current Medications    ASCORBIC ACID, VITAMIN C, (VITAMIN C) 1000 MG TABLET    Take 1,000 mg by mouth once daily.    ATORVASTATIN (LIPITOR) 40 MG TABLET    Take 1 tablet (40 mg total) by mouth once daily.    BLOOD SUGAR DIAGNOSTIC STRP    1 strip by Misc.(Non-Drug; Combo Route) route once daily.    BLOOD-GLUCOSE METER KIT    Use as instructed    CALCIUM-VITAMIN D3-VITAMIN K 650 MG-12.5 MCG-40 MCG CHEW    Take by mouth every evening.    CARTILAGE/COLLAGEN II/HYALURON (MOVE FREE ULTRA ORAL)    Take 1 tablet by mouth once daily.    CHROMIUM PICOLINATE 1,000 MCG TAB    Take 1 tablet by mouth once daily.    CLONAZEPAM (KLONOPIN) 0.5 MG TABLET        CYCLOBENZAPRINE (FLEXERIL) 10 MG TABLET    Take 10 mg by mouth 2 (two) times daily.    DULAGLUTIDE (TRULICITY) 1.5 MG/0.5 ML PEN INJECTOR    INJECT 1.5MG (1 PEN) SUBCUTANEOUSLY EVERY WEEK    DULOXETINE (CYMBALTA) 60 MG CAPSULE    Take 120 mg by mouth every morning.    GABAPENTIN (NEURONTIN) 800 MG TABLET    Take 1 tablet (800 mg total) by mouth 2 (two) times daily.    HYDROCODONE-ACETAMINOPHEN (NORCO) 7.5-325 MG PER TABLET    Take 1 tablet by mouth 2 (two) times daily as needed.    LANCETS (ONETOUCH ULTRASOFT  LANCETS) MISC    1 each by Misc.(Non-Drug; Combo Route) route once daily.    LISINOPRIL (PRINIVIL,ZESTRIL) 5 MG TABLET    Take 1 tablet (5 mg total) by mouth once daily.    MONTELUKAST (SINGULAIR) 10 MG TABLET    Take 1 tablet (10 mg total) by mouth every evening.    MULTIVIT-MINERALS/FOLIC ACID (WOMEN'S MULTIVITAMIN GUMMIES ORAL)    Take by mouth once daily.    ONDANSETRON (ZOFRAN) 4 MG TABLET    TAKE 1 TABLET BY MOUTH EVERY 8 HOURS AS NEEDED    VITAMIN D (VITAMIN D3) 1000 UNITS TAB    Take 1,000 Units by mouth once daily.   Discontinued Medications    LIDOCAINE (LIDODERM) 5 %    APPLY 1 PATCH TO AFFECTED AREA, LEAVE ON FOR 12 HOURS THEN REMOVE AND LEAVE OFF FOR 12 HOURS    LORATADINE (CLARITIN) 10 MG TABLET    Take 10 mg by mouth once daily.        1. Need for hepatitis C screening test  -     Hepatitis C Antibody; Future; Expected date: 11/30/2022    2. Type 2 diabetes mellitus with hyperglycemia, unspecified whether long term insulin use  -     POCT Glucose, Hand-Held Device  -     Microalbumin/Creatinine Ratio, urine  -     CBC Auto Differential; Future; Expected date: 11/30/2022  -     Comprehensive Metabolic Panel; Future; Expected date: 11/30/2022  -     Lipid Panel; Future; Expected date: 11/30/2022    3. Screening for colon cancer  -     Ambulatory referral/consult to General Surgery; Future; Expected date: 12/07/2022    4. Screening for cervical cancer  -     Ambulatory referral/consult to Obstetrics / Gynecology; Future; Expected date: 12/07/2022

## 2022-12-01 LAB
ALBUMIN SERPL-MCNC: 4.5 G/DL (ref 3.6–5.1)
ALBUMIN/GLOB SERPL: 1.9 (CALC) (ref 1–2.5)
ALP SERPL-CCNC: 81 U/L (ref 37–153)
ALT SERPL-CCNC: 12 U/L (ref 6–29)
AST SERPL-CCNC: 13 U/L (ref 10–35)
BASOPHILS # BLD AUTO: 46 CELLS/UL (ref 0–200)
BASOPHILS NFR BLD AUTO: 0.6 %
BILIRUB SERPL-MCNC: 0.7 MG/DL (ref 0.2–1.2)
BUN SERPL-MCNC: 24 MG/DL (ref 7–25)
BUN/CREAT SERPL: 17 (CALC) (ref 6–22)
CALCIUM SERPL-MCNC: 9.8 MG/DL (ref 8.6–10.4)
CHLORIDE SERPL-SCNC: 99 MMOL/L (ref 98–110)
CHOLEST SERPL-MCNC: 156 MG/DL
CHOLEST/HDLC SERPL: 2.8 (CALC)
CO2 SERPL-SCNC: 29 MMOL/L (ref 20–32)
CREAT SERPL-MCNC: 1.39 MG/DL (ref 0.5–1.05)
EGFR: 43 ML/MIN/1.73M2
EOSINOPHIL # BLD AUTO: 323 CELLS/UL (ref 15–500)
EOSINOPHIL NFR BLD AUTO: 4.2 %
ERYTHROCYTE [DISTWIDTH] IN BLOOD BY AUTOMATED COUNT: 13 % (ref 11–15)
GLOBULIN SER CALC-MCNC: 2.4 G/DL (CALC) (ref 1.9–3.7)
GLUCOSE SERPL-MCNC: 129 MG/DL (ref 65–139)
HCT VFR BLD AUTO: 37.4 % (ref 35–45)
HCV AB S/CO SERPL IA: 0.04
HCV AB SERPL QL IA: NORMAL
HDLC SERPL-MCNC: 56 MG/DL
HGB BLD-MCNC: 12.4 G/DL (ref 11.7–15.5)
LDLC SERPL CALC-MCNC: 80 MG/DL (CALC)
LYMPHOCYTES # BLD AUTO: 1810 CELLS/UL (ref 850–3900)
LYMPHOCYTES NFR BLD AUTO: 23.5 %
MCH RBC QN AUTO: 30.1 PG (ref 27–33)
MCHC RBC AUTO-ENTMCNC: 33.2 G/DL (ref 32–36)
MCV RBC AUTO: 90.8 FL (ref 80–100)
MONOCYTES # BLD AUTO: 762 CELLS/UL (ref 200–950)
MONOCYTES NFR BLD AUTO: 9.9 %
NEUTROPHILS # BLD AUTO: 4759 CELLS/UL (ref 1500–7800)
NEUTROPHILS NFR BLD AUTO: 61.8 %
NONHDLC SERPL-MCNC: 100 MG/DL (CALC)
PLATELET # BLD AUTO: 344 THOUSAND/UL (ref 140–400)
PMV BLD REES-ECKER: 10.3 FL (ref 7.5–12.5)
POTASSIUM SERPL-SCNC: 4.8 MMOL/L (ref 3.5–5.3)
PROT SERPL-MCNC: 6.9 G/DL (ref 6.1–8.1)
RBC # BLD AUTO: 4.12 MILLION/UL (ref 3.8–5.1)
SODIUM SERPL-SCNC: 138 MMOL/L (ref 135–146)
TRIGL SERPL-MCNC: 105 MG/DL
WBC # BLD AUTO: 7.7 THOUSAND/UL (ref 3.8–10.8)

## 2022-12-27 ENCOUNTER — TELEPHONE (OUTPATIENT)
Dept: UROLOGY | Facility: CLINIC | Age: 62
End: 2022-12-27
Payer: MEDICARE

## 2023-01-04 LAB
LEFT EYE DM RETINOPATHY: NEGATIVE
RIGHT EYE DM RETINOPATHY: NEGATIVE

## 2023-01-05 ENCOUNTER — OFFICE VISIT (OUTPATIENT)
Dept: OBSTETRICS AND GYNECOLOGY | Facility: CLINIC | Age: 63
End: 2023-01-05
Payer: MEDICARE

## 2023-01-05 ENCOUNTER — PATIENT OUTREACH (OUTPATIENT)
Dept: ADMINISTRATIVE | Facility: HOSPITAL | Age: 63
End: 2023-01-05
Payer: MEDICARE

## 2023-01-05 VITALS
DIASTOLIC BLOOD PRESSURE: 91 MMHG | HEART RATE: 68 BPM | WEIGHT: 185 LBS | SYSTOLIC BLOOD PRESSURE: 146 MMHG | HEIGHT: 60 IN | BODY MASS INDEX: 36.32 KG/M2

## 2023-01-05 DIAGNOSIS — Z12.31 SCREENING MAMMOGRAM FOR BREAST CANCER: ICD-10-CM

## 2023-01-05 DIAGNOSIS — Z12.4 SCREENING FOR CERVICAL CANCER: ICD-10-CM

## 2023-01-05 DIAGNOSIS — Z01.419 WELL WOMAN EXAM WITH ROUTINE GYNECOLOGICAL EXAM: Primary | ICD-10-CM

## 2023-01-05 PROCEDURE — 3080F DIAST BP >= 90 MM HG: CPT | Mod: CPTII,S$GLB,, | Performed by: OBSTETRICS & GYNECOLOGY

## 2023-01-05 PROCEDURE — 99386 PREV VISIT NEW AGE 40-64: CPT | Mod: S$GLB,,, | Performed by: OBSTETRICS & GYNECOLOGY

## 2023-01-05 PROCEDURE — 99386 PR PREVENTIVE VISIT,NEW,40-64: ICD-10-PCS | Mod: S$GLB,,, | Performed by: OBSTETRICS & GYNECOLOGY

## 2023-01-05 PROCEDURE — 3080F PR MOST RECENT DIASTOLIC BLOOD PRESSURE >= 90 MM HG: ICD-10-PCS | Mod: CPTII,S$GLB,, | Performed by: OBSTETRICS & GYNECOLOGY

## 2023-01-05 PROCEDURE — 3008F BODY MASS INDEX DOCD: CPT | Mod: CPTII,S$GLB,, | Performed by: OBSTETRICS & GYNECOLOGY

## 2023-01-05 PROCEDURE — 1159F PR MEDICATION LIST DOCUMENTED IN MEDICAL RECORD: ICD-10-PCS | Mod: CPTII,S$GLB,, | Performed by: OBSTETRICS & GYNECOLOGY

## 2023-01-05 PROCEDURE — 3077F SYST BP >= 140 MM HG: CPT | Mod: CPTII,S$GLB,, | Performed by: OBSTETRICS & GYNECOLOGY

## 2023-01-05 PROCEDURE — 3008F PR BODY MASS INDEX (BMI) DOCUMENTED: ICD-10-PCS | Mod: CPTII,S$GLB,, | Performed by: OBSTETRICS & GYNECOLOGY

## 2023-01-05 PROCEDURE — 1159F MED LIST DOCD IN RCRD: CPT | Mod: CPTII,S$GLB,, | Performed by: OBSTETRICS & GYNECOLOGY

## 2023-01-05 PROCEDURE — 3077F PR MOST RECENT SYSTOLIC BLOOD PRESSURE >= 140 MM HG: ICD-10-PCS | Mod: CPTII,S$GLB,, | Performed by: OBSTETRICS & GYNECOLOGY

## 2023-01-05 NOTE — PROGRESS NOTES
Subjective:       Patient ID: Avelina King is a 62 y.o. female.    Chief Complaint: Well Woman    62-year-old female here for annual examination states she is not had an exam for years has no gyn complaints is currently scheduled for a colonoscopy which she is never had.  She does have recent fall where she hit her face on the floor her right eye is bruised    Review of Systems   Constitutional:  Negative for activity change, appetite change, chills, fever and unexpected weight change.   HENT:  Negative for nasal congestion, dental problem, facial swelling, hearing loss and mouth sores.    Respiratory:  Negative for apnea, chest tightness, shortness of breath and wheezing.    Cardiovascular:  Negative for chest pain and leg swelling.   Gastrointestinal:  Negative for abdominal distention, abdominal pain, anal bleeding, blood in stool, constipation, diarrhea, nausea, rectal pain and vomiting.   Genitourinary:  Negative for decreased urine volume, difficulty urinating, dyspareunia, dysuria, enuresis, flank pain, frequency, genital sores, hematuria, menstrual problem, pelvic pain, urgency, vaginal bleeding, vaginal discharge and vaginal pain.   Musculoskeletal:  Negative for arthralgias, back pain, joint swelling, myalgias and neck pain.   Integumentary:  Negative for color change, pallor, rash and wound.   Allergic/Immunologic: Negative for immunocompromised state.   Neurological:  Negative for dizziness, light-headedness and headaches.   Hematological:  Negative for adenopathy. Does not bruise/bleed easily.   Psychiatric/Behavioral:  Negative for agitation, behavioral problems, confusion, sleep disturbance and suicidal ideas. The patient is not nervous/anxious and is not hyperactive.        Objective:      Physical Exam  Constitutional:       Appearance: She is well-developed.   HENT:      Head: Normocephalic.      Nose: Nose normal.   Eyes:      Conjunctiva/sclera: Conjunctivae normal.      Pupils: Pupils are  equal, round, and reactive to light.   Cardiovascular:      Rate and Rhythm: Normal rate and regular rhythm.      Heart sounds: Normal heart sounds.   Pulmonary:      Effort: Pulmonary effort is normal.      Breath sounds: Normal breath sounds.   Abdominal:      General: Bowel sounds are normal.      Palpations: Abdomen is soft.   Genitourinary:     Vagina: Normal.      Comments: Vagina very atrophic vulva and bimanual normal cervix very high normal  Musculoskeletal:         General: Normal range of motion.      Cervical back: Normal range of motion and neck supple.   Skin:     General: Skin is warm and dry.   Neurological:      Mental Status: She is alert and oriented to person, place, and time.   Psychiatric:         Behavior: Behavior normal.         Thought Content: Thought content normal.   Breast exam normal    Assessment:       Problem List Items Addressed This Visit    None  Visit Diagnoses       Well woman exam with routine gynecological exam    -  Primary    Relevant Orders    Liquid-based pap smear, screening    Screening for cervical cancer        Relevant Orders    Liquid-based pap smear, screening    Screening mammogram for breast cancer        Relevant Orders    Mammo Digital Screening Bilat            Mammogram follow-up 1 year  Plan:

## 2023-01-09 ENCOUNTER — TELEPHONE (OUTPATIENT)
Dept: SURGERY | Facility: CLINIC | Age: 63
End: 2023-01-09
Payer: MEDICARE

## 2023-01-09 DIAGNOSIS — Z12.11 COLON CANCER SCREENING: Primary | ICD-10-CM

## 2023-01-09 NOTE — TELEPHONE ENCOUNTER
[facility] - General Surgery  [address] [address]  Phone: [phone]  Fax: [phone]    History & Physical         Provider: Dr. John Herrera DO    Patient Name: Avelina DAWSON (age):1960  62 y.o.           Gender: female   Phone: [phone]     Referring Physician:  Lise Powell MD    Vital Signs:   Height - 5'  Weight - 83.9 kg  BMI -  36.13 kg/m2    Plan: Colonoscopy     Encounter Diagnosis   Name Primary?    Colon cancer screening Yes           History:      Past Medical History:   Diagnosis Date    Anxiety     Arthritis     At high risk for falls     Depression     Diabetes 1.5, managed as type 2     Fall     hip injury, will need surgery    Fibromyalgia     High cholesterol     Hypertension     Lower extremity weakness     left knee    Obesity     Vitamin C deficiency       Past Surgical History:   Procedure Laterality Date    BACK SURGERY      BARIATRIC SURGERY      CARPAL TUNNEL RELEASE      right    CERVICAL FUSION      ROTATOR CUFF REPAIR      TOTAL SHOULDER ARTHROPLASTY  2016/2018    x2    TUBAL LIGATION  1981      Medication List with Changes/Refills   Current Medications    ASCORBIC ACID, VITAMIN C, (VITAMIN C) 1000 MG TABLET    Take 1,000 mg by mouth once daily.    ATORVASTATIN (LIPITOR) 40 MG TABLET    Take 1 tablet (40 mg total) by mouth once daily.    BLOOD SUGAR DIAGNOSTIC STRP    1 strip by Misc.(Non-Drug; Combo Route) route once daily.    BLOOD-GLUCOSE METER KIT    Use as instructed    CALCIUM-VITAMIN D3-VITAMIN K 650 MG-12.5 MCG-40 MCG CHEW    Take by mouth every evening.    CARTILAGE/COLLAGEN II/HYALURON (MOVE FREE ULTRA ORAL)    Take 1 tablet by mouth once daily.    CHROMIUM PICOLINATE 1,000 MCG TAB    Take 1 tablet by mouth once daily.    CLONAZEPAM (KLONOPIN) 0.5 MG TABLET        CYCLOBENZAPRINE (FLEXERIL) 10 MG TABLET    Take 10 mg by mouth 2 (two) times  daily.    DULAGLUTIDE (TRULICITY) 1.5 MG/0.5 ML PEN INJECTOR    INJECT 1.5MG (1 PEN) SUBCUTANEOUSLY EVERY WEEK    DULOXETINE (CYMBALTA) 60 MG CAPSULE    Take 120 mg by mouth every morning.    GABAPENTIN (NEURONTIN) 800 MG TABLET    Take 1 tablet (800 mg total) by mouth 2 (two) times daily.    HYDROCODONE-ACETAMINOPHEN (NORCO) 7.5-325 MG PER TABLET    Take 1 tablet by mouth 2 (two) times daily as needed.    LANCETS (ONETOUCH ULTRASOFT LANCETS) MISC    1 each by Misc.(Non-Drug; Combo Route) route once daily.    LISINOPRIL (PRINIVIL,ZESTRIL) 5 MG TABLET    Take 1 tablet (5 mg total) by mouth once daily.    MONTELUKAST (SINGULAIR) 10 MG TABLET    Take 1 tablet (10 mg total) by mouth every evening.    MULTIVIT-MINERALS/FOLIC ACID (WOMEN'S MULTIVITAMIN GUMMIES ORAL)    Take by mouth once daily.    ONDANSETRON (ZOFRAN) 4 MG TABLET    TAKE 1 TABLET BY MOUTH EVERY 8 HOURS AS NEEDED    VITAMIN D (VITAMIN D3) 1000 UNITS TAB    Take 1,000 Units by mouth once daily.      Review of patient's allergies indicates:   Allergen Reactions    Macrodantin [nitrofurantoin macrocrystal] Swelling    Penicillins Shortness Of Breath and Rash      No family history on file.   Social History     Tobacco Use    Smoking status: Never    Smokeless tobacco: Never   Substance Use Topics    Alcohol use: Never    Drug use: Never        Physical Examination:     General Appearance:___________________________  HEENT: _____________________________________  Abdomen:____________________________________  Heart:________________________________________  Lungs:_______________________________________  Extremities:___________________________________  Skin:_________________________________________  Endocrine:____________________________________  Genitourinary:_________________________________  Neurological:__________________________________      Patient has been evaluated immediately prior to sedation and is medically cleared for endoscopy with IVCS as an ASA  class: ______      Physician Signature: _________________________       Date: ________  Time: ________

## 2023-01-09 NOTE — TELEPHONE ENCOUNTER
Faxed colonoscopy orders and H&P to  fx [phone]. Fax successfully went through. Rx for Sutab called into pt's preferred pharmacy ([facility]) and left on VM.

## 2023-01-12 LAB — Lab: NORMAL

## 2023-03-22 RX ORDER — DULAGLUTIDE 1.5 MG/.5ML
INJECTION, SOLUTION SUBCUTANEOUS
Qty: 4 PEN | Refills: 3 | Status: SHIPPED | OUTPATIENT
Start: 2023-03-22 | End: 2023-10-17

## 2023-04-04 ENCOUNTER — PATIENT MESSAGE (OUTPATIENT)
Dept: ADMINISTRATIVE | Facility: HOSPITAL | Age: 63
End: 2023-04-04
Payer: MEDICARE

## 2023-04-04 ENCOUNTER — PATIENT MESSAGE (OUTPATIENT)
Dept: PRIMARY CARE CLINIC | Facility: CLINIC | Age: 63
End: 2023-04-04
Payer: MEDICARE

## 2023-04-20 ENCOUNTER — OFFICE VISIT (OUTPATIENT)
Dept: PRIMARY CARE CLINIC | Facility: CLINIC | Age: 63
End: 2023-04-20
Payer: MEDICARE

## 2023-04-20 VITALS
HEART RATE: 71 BPM | WEIGHT: 178.5 LBS | BODY MASS INDEX: 34.86 KG/M2 | SYSTOLIC BLOOD PRESSURE: 152 MMHG | OXYGEN SATURATION: 97 % | DIASTOLIC BLOOD PRESSURE: 79 MMHG

## 2023-04-20 DIAGNOSIS — E66.9 OBESITY (BMI 30-39.9): ICD-10-CM

## 2023-04-20 DIAGNOSIS — G89.29 CHRONIC LOW BACK PAIN, UNSPECIFIED BACK PAIN LATERALITY, UNSPECIFIED WHETHER SCIATICA PRESENT: ICD-10-CM

## 2023-04-20 DIAGNOSIS — E11.65 TYPE 2 DIABETES MELLITUS WITH HYPERGLYCEMIA, UNSPECIFIED WHETHER LONG TERM INSULIN USE: ICD-10-CM

## 2023-04-20 DIAGNOSIS — G56.00 CARPAL TUNNEL SYNDROME, UNSPECIFIED LATERALITY: Primary | ICD-10-CM

## 2023-04-20 DIAGNOSIS — M54.50 CHRONIC LOW BACK PAIN, UNSPECIFIED BACK PAIN LATERALITY, UNSPECIFIED WHETHER SCIATICA PRESENT: ICD-10-CM

## 2023-04-20 DIAGNOSIS — J30.9 ALLERGIC RHINITIS, UNSPECIFIED SEASONALITY, UNSPECIFIED TRIGGER: ICD-10-CM

## 2023-04-20 PROCEDURE — 3077F SYST BP >= 140 MM HG: CPT | Mod: CPTII,S$GLB,, | Performed by: INTERNAL MEDICINE

## 2023-04-20 PROCEDURE — 3078F PR MOST RECENT DIASTOLIC BLOOD PRESSURE < 80 MM HG: ICD-10-PCS | Mod: CPTII,S$GLB,, | Performed by: INTERNAL MEDICINE

## 2023-04-20 PROCEDURE — 3008F PR BODY MASS INDEX (BMI) DOCUMENTED: ICD-10-PCS | Mod: CPTII,S$GLB,, | Performed by: INTERNAL MEDICINE

## 2023-04-20 PROCEDURE — 99214 OFFICE O/P EST MOD 30 MIN: CPT | Mod: S$GLB,,, | Performed by: INTERNAL MEDICINE

## 2023-04-20 PROCEDURE — 3078F DIAST BP <80 MM HG: CPT | Mod: CPTII,S$GLB,, | Performed by: INTERNAL MEDICINE

## 2023-04-20 PROCEDURE — 3008F BODY MASS INDEX DOCD: CPT | Mod: CPTII,S$GLB,, | Performed by: INTERNAL MEDICINE

## 2023-04-20 PROCEDURE — 99214 PR OFFICE/OUTPT VISIT, EST, LEVL IV, 30-39 MIN: ICD-10-PCS | Mod: S$GLB,,, | Performed by: INTERNAL MEDICINE

## 2023-04-20 PROCEDURE — 4010F PR ACE/ARB THEARPY RXD/TAKEN: ICD-10-PCS | Mod: CPTII,S$GLB,, | Performed by: INTERNAL MEDICINE

## 2023-04-20 PROCEDURE — 4010F ACE/ARB THERAPY RXD/TAKEN: CPT | Mod: CPTII,S$GLB,, | Performed by: INTERNAL MEDICINE

## 2023-04-20 PROCEDURE — 3077F PR MOST RECENT SYSTOLIC BLOOD PRESSURE >= 140 MM HG: ICD-10-PCS | Mod: CPTII,S$GLB,, | Performed by: INTERNAL MEDICINE

## 2023-04-20 RX ORDER — TIZANIDINE 4 MG/1
TABLET ORAL
COMMUNITY
Start: 2023-04-06

## 2023-04-20 RX ORDER — MONTELUKAST SODIUM 10 MG/1
10 TABLET ORAL NIGHTLY
Qty: 90 TABLET | Refills: 3 | Status: SHIPPED | OUTPATIENT
Start: 2023-04-20

## 2023-04-20 NOTE — PROGRESS NOTES
Subjective:      Patient ID: Avelina King is a 62 y.o. female.    Chief Complaint: Follow-up (Patient had a fall 03/2023 and went to [facility] ER. Right knee knot and pain radiates into the right groin. ), Referral (To Dr Alegre -carpal tunnel and EMG done in past. ), and Constipation (The patient states that she does stay constipated. She is taking dulcolax every other day. Last BM was two days ago. She states it is round and hard and she does not pass any more than 3 at a time. She also states she has tried prune juice, dried prunes, raisins, all w/o any relief. I did let her know that pain medication can also cause constipation. )    HPI    Past Medical History:   Diagnosis Date    Anxiety     Arthritis     At high risk for falls     Depression     Diabetes 1.5, managed as type 2     Fall     hip injury, will need surgery    Fibromyalgia     High cholesterol     Hypertension     Lower extremity weakness     left knee    Obesity     Vitamin C deficiency        Patient went to the ER after a fall and a knot had formed and she was afraid she had a DVT and an US was done which was negative  She has a small knot on her inner thigh which she states has decreased from initial fall  She is recovering well from her back surgery and is doing a lot better than before. She does use a buggy when she goes grocery shopping as she gets tired from walking long distances     Review of Systems   Constitutional:  Negative for chills and fever.   Respiratory:  Negative for cough, shortness of breath and wheezing.    Cardiovascular:  Negative for chest pain, palpitations and leg swelling.   Gastrointestinal:  Positive for constipation. Negative for abdominal pain, nausea and vomiting.   Genitourinary:  Negative for dysuria, frequency and urgency.   Musculoskeletal:  Positive for joint pain.   Skin:  Negative for rash.   Neurological:  Positive for sensory change. Negative for dizziness and headaches.        Reports carpal tunnel  symptoms    Psychiatric/Behavioral:  Negative for substance abuse and suicidal ideas.    Objective:     Physical Exam  Vitals reviewed.   Constitutional:       Appearance: Normal appearance. She is obese.   HENT:      Head: Normocephalic.   Eyes:      Extraocular Movements: Extraocular movements intact.      Conjunctiva/sclera: Conjunctivae normal.      Pupils: Pupils are equal, round, and reactive to light.   Cardiovascular:      Rate and Rhythm: Normal rate and regular rhythm.   Pulmonary:      Effort: Pulmonary effort is normal.      Breath sounds: Normal breath sounds.   Abdominal:      General: Bowel sounds are normal.   Musculoskeletal:      Right lower leg: No edema.      Left lower leg: No edema.      Comments: Patient has a small knot on the medial aspect of her thigh, no bruising noted, normal gait   Skin:     General: Skin is warm.      Capillary Refill: Capillary refill takes less than 2 seconds.   Neurological:      General: No focal deficit present.      Mental Status: She is alert and oriented to person, place, and time.   Psychiatric:         Mood and Affect: Mood normal.     BP (!) 152/79 (BP Location: Left arm, Patient Position: Sitting, BP Method: Medium (Automatic))   Pulse 71   Wt 81 kg (178 lb 8 oz)   SpO2 97%   BMI 34.86 kg/m²     Assessment:       ICD-10-CM ICD-9-CM   1. Carpal tunnel syndrome, unspecified laterality  G56.00 354.0   2. Chronic low back pain, unspecified back pain laterality, unspecified whether sciatica present  M54.50 724.2    G89.29 338.29   3. Type 2 diabetes mellitus with hyperglycemia, unspecified whether long term insulin use  E11.65 250.00   4. Obesity (BMI 30-39.9)  E66.9 278.00   5. Allergic rhinitis, unspecified seasonality, unspecified trigger  J30.9 477.9       Plan:     Medication List with Changes/Refills   Current Medications    ASCORBIC ACID, VITAMIN C, (VITAMIN C) 1000 MG TABLET    Take 1,000 mg by mouth once daily.    ATORVASTATIN (LIPITOR) 40 MG TABLET     Take 1 tablet (40 mg total) by mouth once daily.    BLOOD SUGAR DIAGNOSTIC STRP    1 strip by Misc.(Non-Drug; Combo Route) route once daily.    BLOOD-GLUCOSE METER KIT    Use as instructed    CALCIUM-VITAMIN D3-VITAMIN K 650 MG-12.5 MCG-40 MCG CHEW    Take by mouth every evening.    CARTILAGE/COLLAGEN II/HYALURON (MOVE FREE ULTRA ORAL)    Take 1 tablet by mouth once daily.    CHROMIUM PICOLINATE 1,000 MCG TAB    Take 1 tablet by mouth once daily.    CLONAZEPAM (KLONOPIN) 0.5 MG TABLET        DULAGLUTIDE (TRULICITY) 1.5 MG/0.5 ML PEN INJECTOR    INJECT 1.5MG (1 PEN) SUBCUTANEOUSLY EVERY WEEK    DULOXETINE (CYMBALTA) 60 MG CAPSULE    Take 120 mg by mouth every morning.    GABAPENTIN (NEURONTIN) 800 MG TABLET    Take 1 tablet (800 mg total) by mouth 2 (two) times daily.    HYDROCODONE-ACETAMINOPHEN (NORCO) 7.5-325 MG PER TABLET    Take 1 tablet by mouth 2 (two) times daily as needed.    LANCETS (ONETOUCH ULTRASOFT LANCETS) MISC    1 each by Misc.(Non-Drug; Combo Route) route once daily.    LISINOPRIL (PRINIVIL,ZESTRIL) 5 MG TABLET    Take 1 tablet (5 mg total) by mouth once daily.    MULTIVIT-MINERALS/FOLIC ACID (WOMEN'S MULTIVITAMIN GUMMIES ORAL)    Take by mouth once daily.    ONDANSETRON (ZOFRAN) 4 MG TABLET    TAKE 1 TABLET BY MOUTH EVERY 8 HOURS AS NEEDED    TIZANIDINE (ZANAFLEX) 4 MG TABLET        VITAMIN D (VITAMIN D3) 1000 UNITS TAB    Take 1,000 Units by mouth once daily.   Changed and/or Refilled Medications    Modified Medication Previous Medication    MONTELUKAST (SINGULAIR) 10 MG TABLET montelukast (SINGULAIR) 10 mg tablet       Take 1 tablet (10 mg total) by mouth every evening.    Take 1 tablet (10 mg total) by mouth every evening.   Discontinued Medications    CYCLOBENZAPRINE (FLEXERIL) 10 MG TABLET    Take 10 mg by mouth 2 (two) times daily.        1. Carpal tunnel syndrome, unspecified laterality  -     Ambulatory referral/consult to Orthopedics; Future; Expected date: 04/27/2023    2. Chronic low  back pain, unspecified back pain laterality, unspecified whether sciatica present    3. Type 2 diabetes mellitus with hyperglycemia, unspecified whether long term insulin use  -     Urinalysis, Reflex to Urine Culture Urine, Clean Catch; Future; Expected date: 04/20/2023  -     Hemoglobin A1C; Future; Expected date: 04/20/2023  -     Microalbumin/creatinine urine ratio    4. Obesity (BMI 30-39.9)    5. Allergic rhinitis, unspecified seasonality, unspecified trigger  -     montelukast (SINGULAIR) 10 mg tablet; Take 1 tablet (10 mg total) by mouth every evening.  Dispense: 90 tablet; Refill: 3       Recommend miralax for constipation       Future Appointments   Date Time Provider Department Center   5/29/2023  1:40 PM Lise Powell MD [id number]   1/9/2024 10:00 AM Trey Carmona MD [id number]

## 2023-05-10 ENCOUNTER — PATIENT OUTREACH (OUTPATIENT)
Dept: ADMINISTRATIVE | Facility: HOSPITAL | Age: 63
End: 2023-05-10
Payer: MEDICARE

## 2023-05-10 NOTE — PROGRESS NOTES
Working Mammogram Report:     Pt overdue for mammogram. Called to offer scheduling.  No answer, LVM for a return call. Order placed, unable to see if appt has been scheduled.

## 2023-05-18 ENCOUNTER — TELEPHONE (OUTPATIENT)
Dept: FAMILY MEDICINE | Facility: CLINIC | Age: 63
End: 2023-05-18
Payer: MEDICARE

## 2023-05-18 NOTE — TELEPHONE ENCOUNTER
Advised pt. We are not taking Medicaid at this time and gave her #'s to offices that are taking Medicaid.  Pt. Verbalized understanding.

## 2023-05-18 NOTE — TELEPHONE ENCOUNTER
----- Message from Chelo Bob sent at 5/18/2023  1:21 PM CDT -----  Contact: pt  Pt calling would like to schedule appt as np and she can be reached at [phone]     Thanks,

## 2023-05-25 ENCOUNTER — PATIENT MESSAGE (OUTPATIENT)
Dept: ADMINISTRATIVE | Facility: HOSPITAL | Age: 63
End: 2023-05-25
Payer: MEDICARE

## 2023-06-21 ENCOUNTER — PATIENT OUTREACH (OUTPATIENT)
Dept: ADMINISTRATIVE | Facility: HOSPITAL | Age: 63
End: 2023-06-21
Payer: MEDICARE

## 2023-06-21 NOTE — PROGRESS NOTES
HTN REPORT: Called and spoke to pt she does not have a bp cuff at home, but will ask conradosuzetteter to go and purchase one, she declined offer for [place] appt.  She will call back once she gets it checked and give reading.

## 2023-06-27 ENCOUNTER — PATIENT OUTREACH (OUTPATIENT)
Dept: ADMINISTRATIVE | Facility: HOSPITAL | Age: 63
End: 2023-06-27
Payer: MEDICARE

## 2023-06-27 NOTE — PROGRESS NOTES
Working HTN report:     Called to discuss scheduling appointment and to get updated BP reading. Patient does not have a BP cuff, she plans to get one and call with her reading. Declined nurse visit.

## 2023-07-24 PROBLEM — F33.2 SEVERE EPISODE OF RECURRENT MAJOR DEPRESSIVE DISORDER, WITHOUT PSYCHOTIC FEATURES: Status: ACTIVE | Noted: 2023-07-24

## 2023-07-25 PROBLEM — E11.9 TYPE 2 DIABETES MELLITUS, WITHOUT LONG-TERM CURRENT USE OF INSULIN: Chronic | Status: ACTIVE | Noted: 2023-07-25

## 2023-07-25 PROBLEM — E78.5 HLD (HYPERLIPIDEMIA): Chronic | Status: ACTIVE | Noted: 2023-07-25

## 2023-07-25 PROBLEM — Z13.9 ENCOUNTER FOR MEDICAL SCREENING EXAMINATION: Status: ACTIVE | Noted: 2023-07-25

## 2023-07-25 PROBLEM — I10 HTN (HYPERTENSION): Chronic | Status: ACTIVE | Noted: 2023-07-25

## 2023-08-08 ENCOUNTER — TELEPHONE (OUTPATIENT)
Dept: PRIMARY CARE CLINIC | Facility: CLINIC | Age: 63
End: 2023-08-08
Payer: MEDICAID

## 2023-08-08 NOTE — TELEPHONE ENCOUNTER
Most recent labs from a hospital visit on 07/19/23 were sent as well the last labs from our chart from 11/30/22.     ----- Message from Irma Olivarez sent at 8/8/2023  9:07 AM CDT -----  Contact: Mary(Dr Viviane  Fallon called to consult with nurse or staff regarding the patients labs. She states she is needing the results from the patients recent lab work and would like a call back. She can be reached at [phone] and also left a fax number of [phone]. Thanks/MR

## 2023-08-24 ENCOUNTER — PATIENT MESSAGE (OUTPATIENT)
Dept: ADMINISTRATIVE | Facility: HOSPITAL | Age: 63
End: 2023-08-24
Payer: MEDICAID

## 2023-08-31 ENCOUNTER — PATIENT OUTREACH (OUTPATIENT)
Dept: ADMINISTRATIVE | Facility: HOSPITAL | Age: 63
End: 2023-08-31
Payer: MEDICAID

## 2023-08-31 ENCOUNTER — TELEPHONE (OUTPATIENT)
Dept: ADMINISTRATIVE | Facility: HOSPITAL | Age: 63
End: 2023-08-31
Payer: MEDICAID

## 2023-08-31 VITALS — DIASTOLIC BLOOD PRESSURE: 76 MMHG | SYSTOLIC BLOOD PRESSURE: 118 MMHG

## 2023-10-17 RX ORDER — DULAGLUTIDE 1.5 MG/.5ML
INJECTION, SOLUTION SUBCUTANEOUS
Qty: 4 PEN | Refills: 3 | Status: SHIPPED | OUTPATIENT
Start: 2023-10-17

## 2023-10-19 DIAGNOSIS — E11.65 TYPE 2 DIABETES MELLITUS WITH HYPERGLYCEMIA, UNSPECIFIED WHETHER LONG TERM INSULIN USE: ICD-10-CM

## 2023-10-20 RX ORDER — ATORVASTATIN CALCIUM 40 MG/1
40 TABLET, FILM COATED ORAL DAILY
Qty: 90 TABLET | Refills: 3 | Status: SHIPPED | OUTPATIENT
Start: 2023-10-20

## 2023-10-30 PROBLEM — Z13.9 ENCOUNTER FOR MEDICAL SCREENING EXAMINATION: Status: RESOLVED | Noted: 2023-07-25 | Resolved: 2023-10-30

## 2023-11-23 ENCOUNTER — PATIENT MESSAGE (OUTPATIENT)
Dept: ADMINISTRATIVE | Facility: HOSPITAL | Age: 63
End: 2023-11-23
Payer: MEDICAID

## 2024-01-10 LAB
LEFT EYE DM RETINOPATHY: NEGATIVE
RIGHT EYE DM RETINOPATHY: NEGATIVE

## 2024-01-11 ENCOUNTER — PATIENT OUTREACH (OUTPATIENT)
Dept: ADMINISTRATIVE | Facility: HOSPITAL | Age: 64
End: 2024-01-11
Payer: MEDICAID

## 2024-01-11 NOTE — PROGRESS NOTES
Manually hyper-linked DM EYE EXAM 1/10/2024  TARIK faxed to Dr. Herrera 1x to request colonoscopy records. Reminder set 1 week.

## 2024-01-11 NOTE — LETTER
[id number]    AUTHORIZATION FOR RELEASE OF   CONFIDENTIAL INFORMATION    Dr. John Herrera,    We are seeing Avelina King, date of birth 1960, in the clinic at [facility]. Lise Powell MD is the patient's PCP. Avelina King has an outstanding lab/procedure at the time we reviewed her chart. In order to help keep her health information updated, she has authorized us to request the following medical record(s):          ( x )  COLONOSCOPY & PATHOLOGY REPORT            Please fax records to [facility], [phone]     If you have any questions, please contact    PASHA Burrows at [phone]          Patient Name: Avelina King  : 1960  Patient Phone #: [phone]

## 2024-01-16 NOTE — PROGRESS NOTES
Received returned fax stated Dr. Herrera is not a physician at [facility]. Requested record not found in patient chart

## 2024-02-06 DIAGNOSIS — Z12.11 COLON CANCER SCREENING: ICD-10-CM

## 2024-02-22 ENCOUNTER — PATIENT MESSAGE (OUTPATIENT)
Dept: ADMINISTRATIVE | Facility: HOSPITAL | Age: 64
End: 2024-02-22
Payer: MEDICAID

## 2024-10-09 DIAGNOSIS — N39.3 SUI (STRESS URINARY INCONTINENCE, FEMALE): Primary | ICD-10-CM

## 2025-02-21 DIAGNOSIS — M25.521 PAIN IN RIGHT ELBOW: Primary | ICD-10-CM

## 2025-03-25 ENCOUNTER — TELEPHONE (OUTPATIENT)
Dept: PAIN MEDICINE | Facility: CLINIC | Age: 65
End: 2025-03-25
Payer: MEDICARE

## 2025-03-25 NOTE — TELEPHONE ENCOUNTER
----- Message from Shasha sent at 3/25/2025 10:17 AM CDT -----  Contact: self  Type:  Needs Medical AdviceWho Called: Avelina Kramerould the patient rather a call back or a response via [facility]? Call Lindaest Call Back Number: [phone] Information: pt stating she was told she would be receiving some paperwork through her email to fill out. States she may have deleted the email and is needing it to be sent over again

## 2025-03-26 ENCOUNTER — TELEPHONE (OUTPATIENT)
Dept: PAIN MEDICINE | Facility: CLINIC | Age: 65
End: 2025-03-26
Payer: MEDICARE

## 2025-03-26 NOTE — TELEPHONE ENCOUNTER
----- Message from Sandro sent at 3/26/2025  2:37 PM CDT -----  Contact: ROSINA FINNEGAN [[id number]]  ..Type:  Patient Requesting CallWho Called:ROSINA FINNEGAN [[id number]]Does the patient know what this is regarding?:never got paperwork emailed to her that she need to fill out prior to her appointment on tomorrow 03/27/25Would the patient rather a call back or a response via [facility]? Call Best Call Back Number:[phone] (home) Additional Information: 2nd time calling

## 2025-03-26 NOTE — TELEPHONE ENCOUNTER
I spoke with Mrs King to verify her email address, I then emailed the np pww to [email or web address].

## 2025-03-27 ENCOUNTER — OFFICE VISIT (OUTPATIENT)
Dept: PAIN MEDICINE | Facility: CLINIC | Age: 65
End: 2025-03-27
Payer: MEDICARE

## 2025-03-27 VITALS
OXYGEN SATURATION: 99 % | DIASTOLIC BLOOD PRESSURE: 84 MMHG | WEIGHT: 137 LBS | HEART RATE: 92 BPM | SYSTOLIC BLOOD PRESSURE: 133 MMHG | BODY MASS INDEX: 26.9 KG/M2 | HEIGHT: 60 IN

## 2025-03-27 DIAGNOSIS — M70.62 GREATER TROCHANTERIC BURSITIS OF LEFT HIP: ICD-10-CM

## 2025-03-27 DIAGNOSIS — M53.3 SACROILIAC JOINT DYSFUNCTION: ICD-10-CM

## 2025-03-27 DIAGNOSIS — M54.12 CERVICAL MYELOPATHY WITH CERVICAL RADICULOPATHY: ICD-10-CM

## 2025-03-27 DIAGNOSIS — M77.11 RIGHT LATERAL EPICONDYLITIS: ICD-10-CM

## 2025-03-27 DIAGNOSIS — M54.12 CERVICAL RADICULOPATHY: ICD-10-CM

## 2025-03-27 DIAGNOSIS — G95.9 CERVICAL MYELOPATHY WITH CERVICAL RADICULOPATHY: ICD-10-CM

## 2025-03-27 DIAGNOSIS — M54.51 VERTEBROGENIC LOW BACK PAIN: ICD-10-CM

## 2025-03-27 DIAGNOSIS — G89.29 CHRONIC BILATERAL LOW BACK PAIN WITHOUT SCIATICA: ICD-10-CM

## 2025-03-27 DIAGNOSIS — M53.3 CHRONIC LEFT SACROILIAC JOINT PAIN: Primary | ICD-10-CM

## 2025-03-27 DIAGNOSIS — M53.3 CHRONIC LEFT SACROILIAC JOINT PAIN: ICD-10-CM

## 2025-03-27 DIAGNOSIS — M25.521 PAIN IN RIGHT ELBOW: ICD-10-CM

## 2025-03-27 DIAGNOSIS — M47.816 LUMBAR SPONDYLOSIS: ICD-10-CM

## 2025-03-27 DIAGNOSIS — R29.2: Primary | ICD-10-CM

## 2025-03-27 DIAGNOSIS — M54.50 CHRONIC BILATERAL LOW BACK PAIN WITHOUT SCIATICA: ICD-10-CM

## 2025-03-27 DIAGNOSIS — S32.000A COMPRESSION FRACTURE OF LUMBAR VERTEBRA, UNSPECIFIED LUMBAR VERTEBRAL LEVEL, INITIAL ENCOUNTER: ICD-10-CM

## 2025-03-27 DIAGNOSIS — M89.8X2 PAIN OF RIGHT HUMERUS: ICD-10-CM

## 2025-03-27 DIAGNOSIS — G89.29 CHRONIC LEFT SACROILIAC JOINT PAIN: ICD-10-CM

## 2025-03-27 DIAGNOSIS — G89.29 CHRONIC LEFT SACROILIAC JOINT PAIN: Primary | ICD-10-CM

## 2025-03-27 PROBLEM — I63.9 CVA (CEREBRAL VASCULAR ACCIDENT): Status: RESOLVED | Noted: 2025-03-27 | Resolved: 2025-03-27

## 2025-03-27 PROBLEM — H81.10 BPPV (BENIGN PAROXYSMAL POSITIONAL VERTIGO): Status: RESOLVED | Noted: 2025-03-27 | Resolved: 2025-03-27

## 2025-03-27 RX ORDER — TRAZODONE HYDROCHLORIDE 50 MG/1
25 TABLET ORAL
COMMUNITY

## 2025-03-27 RX ORDER — KETOCONAZOLE 20 MG/ML
SHAMPOO, SUSPENSION TOPICAL
COMMUNITY
Start: 2025-03-11

## 2025-03-27 RX ORDER — METHYLPREDNISOLONE ACETATE 40 MG/ML
40 INJECTION, SUSPENSION INTRA-ARTICULAR; INTRALESIONAL; INTRAMUSCULAR; SOFT TISSUE
Status: DISCONTINUED | OUTPATIENT
Start: 2025-03-27 | End: 2025-03-27 | Stop reason: HOSPADM

## 2025-03-27 RX ORDER — BUSPIRONE HYDROCHLORIDE 10 MG/1
TABLET ORAL
COMMUNITY

## 2025-03-27 RX ORDER — ARIPIPRAZOLE 5 MG/1
TABLET ORAL
COMMUNITY
Start: 2025-03-18

## 2025-03-27 RX ORDER — ALENDRONATE SODIUM TABLET 35 MG/1
TABLET ORAL
COMMUNITY
Start: 2025-03-07

## 2025-03-27 RX ORDER — DOXEPIN HYDROCHLORIDE 10 MG/ML
SOLUTION ORAL
COMMUNITY
Start: 2025-02-19

## 2025-03-27 RX ORDER — PRAZOSIN HYDROCHLORIDE 1 MG/1
CAPSULE ORAL
COMMUNITY
Start: 2025-02-28

## 2025-03-27 RX ADMIN — METHYLPREDNISOLONE ACETATE 40 MG: 40 INJECTION, SUSPENSION INTRA-ARTICULAR; INTRALESIONAL; INTRAMUSCULAR; SOFT TISSUE at 02:03

## 2025-03-27 NOTE — PROCEDURES
Tendon Origin: R elbow    Date/Time: 3/27/2025 2:00 PM    Performed by: Flavia Crockett MD  Authorized by: Flavia Crockett MD    Consent Done?:  Yes (Written)  Timeout: prior to procedure the correct patient, procedure, and site was verified    Indications:  Pain  Site marked: the procedure site was marked    Timeout: prior to procedure the correct patient, procedure, and site was verified    Location:  Elbow  Site:  R elbow (Lateral epicondyle)  Prep: patient was prepped and draped in usual sterile fashion    Ultrasonic Guidance for Needle Placement?: No    Needle size:  25 G  Approach:  Posterolateral  Medications:  40 mg methylPREDNISolone acetate 40 mg/mL  Patient tolerance:  Patient tolerated the procedure well with no immediate complications

## 2025-03-27 NOTE — PROGRESS NOTES
[facility]      Referring Provider: Dusty Chan Md  [address], [address]  [facility], [place] [address]    Chief Complaint:   Chief Complaint   Patient presents with    Elbow Pain       History of Present Illness: Avelina King is a 64 y.o. female referred by Dr. Dusty Chan for right elbow pain.      Right arm pain:   Onset: She had a left shoulder replacement in 2018 which worked and then had right shoulder replacement in 2019 with Dr. Chan which helped the shoulder, but she started having right distal biceps pain immediately after surgery. Since Onset, Pain has been stable.    - She had EMG/NCS of BUE w/ Dr. WINTERS, which she states showed CTS and neuropathy.   Location: right distal biceps  Radiation: right hand    Quality: Stabbing  Timing: Severity fluctuates, but always present  Exacerbating Factors: Arm/Shoulder movements  Alleviating Factors: Nothing    Back pain has been present for many years. She fell at work in Nov 2019.  She had her first back surgery in June 2021 with Dr. Deleon which didn't help. She then had back surgery with Dr. Luz in Aug 2024 which helped her leg pains but the back pain persists. Pain is localized to the midline and left low back with radiation into the left lateral hip. The pain is aggravated by walking, movement, sitting upright, bending forward to lift something. The pain is alleviated by leaning back, resting. Denies saddle anesthesia.       Associated Symptoms: (+) weakness in arm/hand(s) right, (+) numbness in bilateral hand(s), (+) weakness in leg(s), Denies numbness in leg(s), (+) changes in handwriting, (+) difficulty with buttons, (+) changes in gait, Denies pain waking at night, Denies night sweats, Denies fevers, Denies headaches, Denies history of cancer, Denies unexplained weight loss, Denies changes in bowel function, Denies changes in bladder function.    Severity: Current: 8/10   Worst Pain:  10/10     Least Pain: 8/10     Average Pain: 10/10    Baseline PEG: 10/10    The Neck Disability Index Questionnaire    1. Pain Intensity: 4 - The pain is severe but comes and goes.   2. Personal Care: 2 - It is painful to look after myself, and I am slow and careful.  3. Liftin - I can lift very light weights.  4. Readin - I can read as much as I want to with no pain in my neck.  5. Headache: 3 - I have moderate headaches which come frequently.  6. Concentration 2 - I have a fair degree of difficulty in concentrating when I want to.  7. Work 4 - I can hardly do any work at all.   8. Driving 5 - I cannot drive my car at all.   9. Sleeping 5 - I sleep is completely disturbed (5-7 hours sleepless).  10. Recreation 5 - I cannot do any recreational activities at all.     Total: 34/50  If all 10 sections are completed, simply double to patient's score.  If a section is omitted, divide the patient's total score by (5 x the number of sections completed).  Neck Disability Index (NDI) Score: 68%    Prior Work-Up: X-ray      Previous Therapies/Treatments:  Activity Modification (rest, avoiding aggravating factors, etc.): Yes - Not Helpful  Heat (heating pads, etc.): Yes - Not Helpful  Cold (ice packs): Yes - Not Helpful  Physician guided home exercise program: Yes - Not Helpful  PT/OT: Yes - Not Helpful  Chiropractor: Yes - Helpful  Acupuncture: Yes - Helpful  Massage: Not tried  Bracing: Yes - Not Helpful  TENS unit: Yes - Helpful    Previous Medications:   - NSAIDS: Acetaminophen (Tylenol) Tried - Not Helpful and Ibuprofen (Advil) Tried - Not Helpful  - Muscle Relaxants: Baclofen (Fioresal) Tried - Not Helpful  - TCAs: Not tried  - SNRIs/Antidepressants for Pain: Duloxetine (Cymbalta) Tried - Not Helpful  - Topicals: Not tried  - Anticonvulsants: Gabapentin (Neurontin) Tried - Not Helpful  - Opioids: Hydrocodone/Acetaminophen (e.g., Lorcet, Norco, Lortab)    Relevant Surgery: yes    - Bilateral total shoulder  replacement   - Neck surgery (C4-6 ACDF) in 2000 with Dr. Edwards in [place] and then a 2nd neck surgery with Dr. Luz in June 2021(posterior surgical decompression and fusion C3-6)   - 3 different back surgeries, 1 with Dr. Deleon and 2 with Dr. Luz and persistent LBP    Previous Interventions for Pain:  -     Patient has failed > 6 weeks of conservative treatment including: Activity modification (avoiding exacerbating factors), physical therapy, physician-led home exercise program, topical medications, and oral medications. .    Opioid Risk Score       None               Current Pain Medications:  Norco 7.5-325 mg  Duloxetine 60 mg     Blood Thinners: none    Allergies:  Macrodantin [nitrofurantoin macrocrystal] and Penicillins     Full Medication List:  Current Medications[1]     Review of Systems: See HPI    Medical History:   has a past medical history of Anxiety, Arthritis, At high risk for falls, BPPV (benign paroxysmal positional vertigo) (03/27/2025), CVA (cerebral vascular accident) (03/27/2025), Depression, Diabetes 1.5, managed as type 2, Fall, Fibromyalgia, High cholesterol, Hypertension, Lower extremity weakness, Obesity, and Vitamin C deficiency.    Surgical History:   has a past surgical history that includes Total shoulder arthroplasty (2016/2018); Carpal tunnel release (1989); Cervical fusion (2000); Tubal ligation (1981); Rotator cuff repair (2015); Back surgery; and Bariatric Surgery.    Social History:   reports that she has never smoked. She has never used smokeless tobacco. She reports that she does not currently use alcohol. She reports that she does not use drugs.  Employment Status: Working   Occupation:     Family History:  family history is not on file.    Physical Exam:  /84   Pulse 92   Ht 5' (1.524 m)   Wt 62.1 kg (137 lb)   SpO2 99%   BMI 26.76 kg/m²   GEN: No acute distress. Calm, comfortable  HENT: Normocephalic, atraumatic, moist mucous membranes  EYE:  Anicteric sclera, non-injected.   CV: Non-diaphoretic. Regular Rate. Radial Pulses 2+.  RESP: Breathing comfortably. Chest expansion symmetric.  EXT: No clubbing, cyanosis.   SKIN: Warm, & dry to palpation. No visible rashes or lesions of exposed skin.   PSYCH: Pleasant mood and appropriate affect. Recent and remote memory intact.   GAIT: Mod Independent w/ RW, crouched  Neck Exam:       Inspection: No erythema, bruising. Anterior head position      Palpation: No TTP of b/l paraspinals, trapezius      ROM: (+) Limitation in all planes of motion      Provocative Maneuvers:  (-) Spurling's bilaterally  Shoulder Exam:       Inspection: No erythema, bruising.       Palpation: No TTP of right shoulder itself, but (+) TTP in right mid-biceps and distal biceps.       ROM: (+) Limited in abduction, internal rotation b/l      Provocative Maneuvers:  (+) Speed's on right       (-) Yergason's on right  Elbow Exam:       Inspection: No erythema, bruising, deformity, swelling.       Palpation: (+) TTP of right lateral epicondyle      ROM: Full active and passive elbow extension and flexion b/l      Provocative Maneuvers:  (-) at medial elbow with resisted wrist flexion bilaterally  (+) Cozen's Test with pain at lateral elbow with resisted wrist extension on the right  Lumbar Spine Exam:       Inspection: No erythema, bruising. Healed incision      Palpation: (+) TTP of lumbar paraspinals and SIJ bilaterally       ROM: (+) Limited in flexion, extension, lateral bending.       (+) Facet loading bilaterally      (-) Straight Leg Raise bilaterally      (+) MARILU on the left  (+) Gaenslan's Test on left  (+) SIJ Distraction Test bilaterally  (+) Thigh thrust/Posterior Pelvic Pain Provocation test on left  (+) Sacral thrust w/ pain on left  Hip Exam:      Inspection: No gross deformity or apparent leg length discrepancy      Palpation: (+) TTP to left greater trochanteric bursa.       ROM:  No limitation or pain in internal rotation,  external rotation b/l  Neurologic Exam:     Alert. Speech is fluent and appropriate.     Strength: 4/5 in right wrist extension, right elbow flex/ext, b/l hip flexion, b/l knee extension, right AbDM, 5/5 throughout bilateral upper & lower extremities     Sensation:  Grossly intact to light touch in bilateral upper & lower extremities     Reflexes: + in b/l patella, achilles, biceps, brachioradialis, triceps     Tone: No abnormality appreciated in bilateral upper or lower extremities     (+) Messina bilaterally     Sustained Clonus b/l         Imaging:  - X-ray cervical spine 3/27/25:  Posterior surgical fusion C3 through C6 with anterior surgical fusion C4 through C6. No hardware loosening appreciated. Alignment anatomic. Lung parenchyma clear. Prevertebral soft tissues unremarkable.     - X-ray lumbar spine 3/27/25:  Levo scoliotic curvature. Mild superior endplate suspected chronic compression changes at L3, L2, L1, T12 and T11 grade 1 retrolisthesis L1 on L2, L2 on L3 and L3 on L4. Multilevel degenerative disc height loss most prevalent at L5-S1 and L2-3 with endplate degenerative findings. Multilevel facet arthropathy. No acute osseous abnormality. Constipation noted.     - X-ray right shoulder 3/27/25:  Right shoulder arthroplasty present with alignment anatomic. No acute osseous abnormality. Lung parenchyma clear.     - X-ray right elbow 3/27/25:  No acute osseous or soft tissue abnormality.       - CT Right Upper Ext 12/16/24:      - MRI Lumbar spine 7/8/24:          Labs:  BMP  Lab Results   Component Value Date     11/30/2022    K 4.8 11/30/2022    CL 99 11/30/2022    CO2 29 11/30/2022    BUN 24 11/30/2022    CREATININE 1.39 (H) 11/30/2022    CALCIUM 9.8 11/30/2022    EGFRNORACEVR 43 (L) 11/30/2022     Lab Results   Component Value Date    ALT 12 11/30/2022    AST 13 11/30/2022    BILITOT 0.7 11/30/2022     Lab Results   Component Value Date     11/30/2022     Lab Results   Component Value Date     HGBA1C 5.8 (H) 2022       Assessment:  Avelina King is a 64 y.o. female with the following diagnoses based on history, exam, and imagin. Messina's reflex positive    2. Pain in right elbow  -     Ambulatory referral/consult to Pain Clinic  -     X-Ray Elbow Complete Right; Future; Expected date: 2025  -     Tendon Origin: R elbow    3. Cervical radiculopathy  -     X-Ray Cervical Spine AP And Lateral; Future; Expected date: 2025    4. Right lateral epicondylitis  -     Ambulatory Referral/Consult to Physical Therapy; Future; Expected date: 2025  -     Tendon Origin: R elbow    5. Cervical myelopathy with cervical radiculopathy  -     Ambulatory referral/consult to Neurosurgery; Future; Expected date: 2025  -     X-Ray Cervical Spine AP And Lateral; Future; Expected date: 2025    6. Pain of right humerus  -     X-ray Shoulder 2 or More Views Right; Future; Expected date: 2025    7. Chronic bilateral low back pain without sciatica  -     X-Ray Lumbar Spine AP And Lateral; Future; Expected date: 2025    8. Sacroiliac joint dysfunction  -     X-Ray Lumbar Spine AP And Lateral; Future; Expected date: 2025    9. Chronic left sacroiliac joint pain  -     Ambulatory Referral/Consult to Physical Therapy; Future; Expected date: 2025  -     X-Ray Lumbar Spine AP And Lateral; Future; Expected date: 2025    10. Lumbar spondylosis  -     X-Ray Lumbar Spine AP And Lateral; Future; Expected date: 2025    11. Vertebrogenic low back pain  -     X-Ray Lumbar Spine AP And Lateral; Future; Expected date: 2025    12. Greater trochanteric bursitis of left hip  -     Ambulatory Referral/Consult to Physical Therapy; Future; Expected date: 2025    13. Compression fracture of lumbar vertebra, unspecified lumbar vertebral level, initial encounter  -     MRI Lumbar Spine Without Contrast; Future; Expected date: 2025        This is a pleasant  64 y.o. lady presenting with:     - Right elbow pain: Suspect distal biceps tendonitis vs lateral epicondylitis vs cervical radiculopathy  - Cervical Radiculopathy & Myelopathy: (+) grimaldo, brisk reflexes,   - Prior C4-6 ACDF with subsequent adjacent segment disease and severe canal stenosis and underwent a further posterior decompression with Dr. Luz which resolved the pain.  - Exam concerning for myelopathy, and I do not have any updated images from her prior surgery.  I am uncertain if this is premorbid from prior to her decompression, so will refer back to surgeon for assessment.  - chronic axial low back pain.   - prior MRI from her most recent decompression surgery revealed severe stenosis, but leg pains resolved after decompression surgery with Dr. Luz. However, axial pain persisted.    - MRI did reveal diffuse DDD, facet arthropathy, modic 2 at L5-S1 and L1-2    - Exam with primarily left SIJ pain    - Patient with greater than 3 months of moderate to severe low back pain below L5 without radiculopathy or radicular pain.  - Exam reveals greater than 3/5 SIJ provocative maneuvers.  - The pain is severe enough to greatly impact quality of life & function as evidenced on the patients disability/functional score and NRS.   - Patient has failed greater than 6 weeks of conservative management including: Activity modification (avoiding exacerbating factors), physical therapy, physician-led home exercise program, topical medications, and oral medications.  - Left trochanteric bursitis  - Comorbidities: h/o bariatric surgery. HTN. Anxiety and Depression. DM2. CKD. H/o CVA?    Treatment Plan:   - PT/OT/HEP: Refer to PT for back and right elbow. Discussed benefits of exercise for pain.   - Procedures: Performed right lateral epicondyle CSI   - Schedule left sacroiliac joint therapeutic corticosteroid injection and left GTB CSI under fluoroscopic guidance with local sedation. sedation. (CPT Codes 67907 & 69456).  The patient is not allergic to iodinated contrast. Patient is not currently taking blood thinners for cardiovascular prophylaxis  - Medications: No changes recommended at this time.  - Imaging: Reviewed. X-ray c-spine, l-spine, shoulder.    - Order lumbar MRI as multiple VCFs noted in thoracic and lumbar spine but these were not present on imaging last year  - Labs: Reviewed.  Medications are appropriately dosed for current hepatorenal function.  - Refer to Neurosurgery regarding cervical myelopathy (suspect she has been adequately decompressed, and this is residual exam findings from prior to surgery).    Follow Up: RTC 2 weeks after procedure    I spent a total of 64 minutes on the day of the visit.This includes face to face time and non-face to face time preparing to see the patient (eg, review of tests), obtaining and/or reviewing separately obtained history, documenting clinical information in the electronic or other health record, independently interpreting results and communicating results to the patient/family/caregiver, or care coordinator.        Flavia Crockett MD  Interventional Pain Medicine          [1]   Current Outpatient Medications:     alendronate (FOSAMAX) 35 MG tablet, , Disp: , Rfl:     ARIPiprazole (ABILIFY) 5 MG Tab, , Disp: , Rfl:     ascorbic acid, vitamin C, (VITAMIN C) 1000 MG tablet, Take 1,000 mg by mouth once daily., Disp: , Rfl:     atorvastatin (LIPITOR) 40 MG tablet, TAKE 1 TABLET (40 MG TOTAL) BY MOUTH ONCE DAILY., Disp: 90 tablet, Rfl: 3    blood sugar diagnostic Strp, 1 strip by Misc.(Non-Drug; Combo Route) route once daily., Disp: 90 each, Rfl: 0    busPIRone (BUSPAR) 10 MG tablet, TAKE 1 TABLET BY MOUTH IN THE MORNING AND 1 IN THE AFTERNOON AS NEEDED, Disp: , Rfl:     calcium-vitamin D3-vitamin K 650 mg-12.5 mcg-40 mcg Chew, Take by mouth every evening., Disp: , Rfl:     cartilage/collagen II/hyaluron (MOVE FREE ULTRA ORAL), Take 1 tablet by mouth once daily., Disp: , Rfl:      chromium picolinate 1,000 mcg Tab, Take 1 tablet by mouth once daily., Disp: , Rfl:     doxepin (SINEQUAN) 10 mg/mL solution, , Disp: , Rfl:     dulaglutide (TRULICITY) 1.5 mg/0.5 mL pen injector, INJECT 1.5MG (1 PEN) SUBCUTANEOUSLY EVERY WEEK, Disp: 4 pen , Rfl: 3    DULoxetine (CYMBALTA) 60 MG capsule, Take 120 mg by mouth every morning., Disp: , Rfl:     gabapentin (NEURONTIN) 800 MG tablet, Take 1 tablet (800 mg total) by mouth 2 (two) times daily., Disp: 180 tablet, Rfl: 2    HYDROcodone-acetaminophen (NORCO) 7.5-325 mg per tablet, Take 1 tablet by mouth 2 (two) times daily as needed., Disp: , Rfl:     ketoconazole (NIZORAL) 2 % shampoo, USE AS DIRECTED 2 TO 3 TIMES A WEEK FOR 1 MONTH, Disp: , Rfl:     lancets (ONETOUCH ULTRASOFT LANCETS) Misc, 1 each by Misc.(Non-Drug; Combo Route) route once daily., Disp: 90 each, Rfl: 0    montelukast (SINGULAIR) 10 mg tablet, Take 1 tablet (10 mg total) by mouth every evening. (Patient taking differently: Take 10 mg by mouth as needed.), Disp: 90 tablet, Rfl: 3    multivit-minerals/folic acid (WOMEN'S MULTIVITAMIN GUMMIES ORAL), Take by mouth once daily., Disp: , Rfl:     traZODone (DESYREL) 50 MG tablet, 25 mg., Disp: , Rfl:     vitamin D (VITAMIN D3) 1000 units Tab, Take 1,000 Units by mouth once daily., Disp: , Rfl:     blood-glucose meter kit, Use as instructed, Disp: 1 each, Rfl: 0    clonazePAM (KLONOPIN) 0.5 MG tablet, , Disp: , Rfl:     lisinopriL (PRINIVIL,ZESTRIL) 5 MG tablet, Take 1 tablet (5 mg total) by mouth once daily., Disp: 90 tablet, Rfl: 3    ondansetron (ZOFRAN) 4 MG tablet, TAKE 1 TABLET BY MOUTH EVERY 8 HOURS AS NEEDED (Patient not taking: Reported on 3/27/2025), Disp: 30 tablet, Rfl: 0    prazosin (MINIPRESS) 1 MG Cap, TAKE 1 CAPSULE BY MOUTH ONCE DAILY AT NIGHT AT BEDTIME (Patient not taking: Reported on 3/27/2025), Disp: , Rfl:     tiZANidine (ZANAFLEX) 4 MG tablet, , Disp: , Rfl:

## 2025-03-31 ENCOUNTER — TELEPHONE (OUTPATIENT)
Dept: PAIN MEDICINE | Facility: CLINIC | Age: 65
End: 2025-03-31
Payer: MEDICARE

## 2025-03-31 NOTE — TELEPHONE ENCOUNTER
Pt returned my call after speaking with her insurance company, pt reports insurance is now active for another year. Sara notified, she will recheck on the website later today for status update.

## 2025-03-31 NOTE — TELEPHONE ENCOUNTER
----- Message from Stefania sent at 3/31/2025 10:54 AM CDT -----  Contact: self  Type:  Patient Returning CallWho Called:Avelina Otto Left Message for Patient:unsureDoes the patient know what this is regarding?:MRI orders-insurance questionsWould the patient rather a call back or a response via [employer]? [facility] Call Back Number:[phone] Information: n/a

## 2025-04-08 ENCOUNTER — TELEPHONE (OUTPATIENT)
Dept: PAIN MEDICINE | Facility: CLINIC | Age: 65
End: 2025-04-08
Payer: MEDICARE

## 2025-04-08 DIAGNOSIS — M46.1 SACROILIITIS: Primary | ICD-10-CM

## 2025-04-08 NOTE — TELEPHONE ENCOUNTER
----- Message from Sara sent at 4/8/2025  6:45 AM CDT -----  Regarding: PA  Approved Auth# [id number] for dates 04/11 to 07/11/2025

## 2025-04-08 NOTE — TELEPHONE ENCOUNTER
----- Message from Sara sent at 4/8/2025  6:41 AM CDT -----  It was just submitted Friday and I was out yesterday  ----- Message -----  From: Gavi Zapata LPN  Sent: 4/7/2025  11:20 AM CDT  To: Sara Woody Mrs Ruiz have you heard anything concerning Ms Armenta's procedure ?  ----- Message -----  From: Joyce Benson  Sent: 4/4/2025  11:58 AM CDT  To: Keira Alejandro [facility] is requesting a call back in regards to prior authorization please call her back at [phone]..tracking number [id number]...

## 2025-04-10 ENCOUNTER — TELEPHONE (OUTPATIENT)
Dept: PAIN MEDICINE | Facility: CLINIC | Age: 65
End: 2025-04-10
Payer: MEDICARE

## 2025-04-10 NOTE — TELEPHONE ENCOUNTER
I returned patient call.Patient stated that she has not had her MRI done and wants to cancel her appt for April 11,2025.  Patient will call our office to reschedule appt.

## 2025-04-10 NOTE — TELEPHONE ENCOUNTER
----- Message from Stefania sent at 4/10/2025 10:18 AM CDT -----  Contact: self  Type:  Patient Returning CallWho Called:Avelina Otto Left Message for Patient:unsureDoes the patient know what this is regarding?:cancel appt due to not having MRIWould the patient rather a call back or a response via [facility]? [facility] Call Back Number:[phone] Information: n/a

## 2025-05-09 ENCOUNTER — TELEPHONE (OUTPATIENT)
Dept: PAIN MEDICINE | Facility: CLINIC | Age: 65
End: 2025-05-09
Payer: MEDICARE

## 2025-05-09 NOTE — TELEPHONE ENCOUNTER
"Dr Crockett, I called Ms Quan.   Ms Quan stated that somehow her insurance got changed and she is trying to fix it.  Per Ms Quan, "Once she has it worked out, she will contact our office to re-schedule her appt".    ----- Message from Flavia Crockett MD sent at 5/7/2025  3:13 PM CDT -----  Lost to f/u. Can we check on her please?THanks,KP  "